# Patient Record
Sex: MALE | Race: BLACK OR AFRICAN AMERICAN | NOT HISPANIC OR LATINO | Employment: FULL TIME | URBAN - METROPOLITAN AREA
[De-identification: names, ages, dates, MRNs, and addresses within clinical notes are randomized per-mention and may not be internally consistent; named-entity substitution may affect disease eponyms.]

---

## 2017-08-14 ENCOUNTER — OFFICE VISIT (OUTPATIENT)
Dept: URGENT CARE | Facility: CLINIC | Age: 22
End: 2017-08-14
Payer: OTHER MISCELLANEOUS

## 2017-08-14 VITALS
OXYGEN SATURATION: 100 % | WEIGHT: 141.2 LBS | TEMPERATURE: 99.2 F | DIASTOLIC BLOOD PRESSURE: 80 MMHG | BODY MASS INDEX: 19.77 KG/M2 | HEIGHT: 71 IN | HEART RATE: 94 BPM | SYSTOLIC BLOOD PRESSURE: 128 MMHG

## 2017-08-14 DIAGNOSIS — R11.2 NON-INTRACTABLE VOMITING WITH NAUSEA, UNSPECIFIED VOMITING TYPE: ICD-10-CM

## 2017-08-14 DIAGNOSIS — R42 DIZZINESS: ICD-10-CM

## 2017-08-14 PROCEDURE — 99214 OFFICE O/P EST MOD 30 MIN: CPT | Performed by: FAMILY MEDICINE

## 2017-08-14 RX ORDER — ONDANSETRON 4 MG/1
TABLET, ORALLY DISINTEGRATING ORAL
Qty: 15 TAB | Refills: 0 | Status: SHIPPED | OUTPATIENT
Start: 2017-08-14 | End: 2019-03-14

## 2017-08-14 RX ORDER — MECLIZINE HYDROCHLORIDE 25 MG/1
TABLET ORAL
Qty: 30 TAB | Refills: 0 | Status: SHIPPED | OUTPATIENT
Start: 2017-08-14 | End: 2019-03-14

## 2017-08-14 NOTE — MR AVS SNAPSHOT
"Mauricio Mcfadden   2017 6:30 PM   Office Visit   MRN: [id number]    Department:  [facility]   Dept Phone:  [phone]    Description:  Male : 1995   Provider:  Pradeep Pham M.D.           Reason for Visit     Dizziness \"nausea started friday morning happened out of no where\"      Allergies as of 2017     No Known Allergies      You were diagnosed with     Dizziness   [[id number]]       Non-intractable vomiting with nausea, unspecified vomiting type   [[id number]]         Vital Signs     Blood Pressure Pulse Temperature Height Weight Body Mass Index    128/80 mmHg 94 37.3 °C (99.2 °F) 1.803 m (5' 11\") 64.048 kg (141 lb 3.2 oz) 19.70 kg/m2    Oxygen Saturation Smoking Status                100% Current Every Day Smoker          Basic Information     Date Of Birth Sex Race Ethnicity Preferred Language    1995 Male Black or  Non- English      Health Maintenance        Date Due Completion Dates    IMM HEP B VACCINE (1 of 3 - Primary Series) 1993 ---    IMM HEP A VACCINE (1 of 2 - Standard Series) 1994 ---    IMM HPV VACCINE (1 of 3 - Male 3 Dose Series) 2004 ---    IMM VARICELLA (CHICKENPOX) VACCINE (1 of 2 - 2 Dose Adolescent Series) 2006 ---    IMM DTaP/Tdap/Td Vaccine (1 - Tdap) 2012 ---    IMM INFLUENZA (1) 2017 ---            Current Immunizations     No immunizations on file.      Below and/or attached are the medications your provider expects you to take. Review all of your home medications and newly ordered medications with your provider and/or pharmacist. Follow medication instructions as directed by your provider and/or pharmacist. Please keep your medication list with you and share with your provider. Update the information when medications are discontinued, doses are changed, or new medications (including over-the-counter products) are added; and carry medication information at all times in the event of emergency situations  "    Allergies:  No Known Allergies          Medications  Valid as of: August 14, 2017 -  7:47 PM    Generic Name Brand Name Tablet Size Instructions for use    Albuterol Sulfate (Aero Soln) albuterol 108 (90 BASE) MCG/ACT Inhale 2 Puffs by mouth every 6 hours as needed for Shortness of Breath.        Meclizine HCl (Tab) ANTIVERT 25 MG 0.5 TO 1 TAB EVERY 6 HOURS ONLY IF NEEDED FOR DIZZINESS, NAUSEA, OR VOMITING. MAY CAUSE DROWSINESS.        Ondansetron (TABLET DISPERSIBLE) ZOFRAN ODT 4 MG 1 TAB, ALLOW TO DISINTEGRATE IN MOUTH, EVERY 8 HOURS ONLY IF NEEDED FOR NAUSEA OR VOMITING.        .                 Medicines prescribed today were sent to:     [facility] [address] [place] [address], [place] - [address] [facility] [facility] [address] [place] [address] [address] [facility] [address]    Phone: [phone] Fax: [phone]    Open 24 Hours?: Yes      Medication refill instructions:       If your prescription bottle indicates you have medication refills left, it is not necessary to call your provider’s office. Please contact your pharmacy and they will refill your medication.    If your prescription bottle indicates you do not have any refills left, you may request refills at any time through one of the following ways: The online CardSpring system (except Urgent Care), by calling your provider’s office, or by asking your pharmacy to contact your provider’s office with a refill request. Medication refills are processed only during regular business hours and may not be available until the next business day. Your provider may request additional information or to have a follow-up visit with you prior to refilling your medication.   *Please Note: Medication refills are assigned a new Rx number when refilled electronically. Your pharmacy may indicate that no refills were authorized even though a new prescription for the same medication is available at the pharmacy. Please request the medicine by name with the pharmacy before contacting your  provider for a refill.           Silvigen Access Code: [email or web address]  Expires: 9/13/2017  7:47 PM    Silvigen  A secure, online tool to manage your health information     [employer]’s Silvigen® is a secure, online tool that connects you to your personalized health information from the privacy of your home -- day or night - making it very easy for you to manage your healthcare. Once the activation process is completed, you can even access your medical information using the Silvigen betito, which is available for free in the Apple Betito store or Google Play store.     Silvigen provides the following levels of access (as shown below):   My Chart Features   [facility] Primary Care Doctor [facility]  Specialists [facility]  Urgent  Care Non-[facility]  Primary Care  Doctor   Email your healthcare team securely and privately 24/7 X X X    Manage appointments: schedule your next appointment; view details of past/upcoming appointments X      Request prescription refills. X      View recent personal medical records, including lab and immunizations X X X X   View health record, including health history, allergies, medications X X X X   Read reports about your outpatient visits, procedures, consult and ER notes X X X X   See your discharge summary, which is a recap of your hospital and/or ER visit that includes your diagnosis, lab results, and care plan. X X       How to register for Silvigen:  1. Go to  [email or web address].  2. Click on the Sign Up Now box, which takes you to the New Member Sign Up page. You will need to provide the following information:  a. Enter your Silvigen Access Code exactly as it appears at the top of this page. (You will not need to use this code after you’ve completed the sign-up process. If you do not sign up before the expiration date, you must request a new code.)   b. Enter your date of birth.   c. Enter your home email address.   d. Click Submit, and follow the next screen’s instructions.  3. Create a  [employer] (acquired by [employer] in 2014)t ID. This will be your [employer] login ID and cannot be changed, so think of one that is secure and easy to remember.  4. Create a [employer] [address] password. You can change your password at any time.  5. Enter your Password Reset Question and Answer. This can be used at a later time if you forget your password.   6. Enter your e-mail address. This allows you to receive e-mail notifications when new information is available in [employer].  7. Click Sign Up. You can now view your health information.    For assistance activating your [employer] account, call [phone]        Quit Tobacco Information     Do you want to quit using tobacco?    Quitting tobacco decreases risks of cancer, heart and lung disease, increases life expectancy, improves sense of taste and smell, and increases spending money, among other benefits.    If you are thinking about quitting, we can help.  • [facility] Quit Tobacco Program: [phone]  o Program occurs weekly for four weeks and includes pharmacist consultation on products to support quitting smoking or chewing tobacco. A provider referral is needed for pharmacist consultation.  • Tobacco Users Help Hotline: 6-061-QUIT-NOW (047-2640) or https://[place].quitlogix.org/  o Free, confidential telephone and online coaching for [place] residents. Sessions are designed on a schedule that is convenient for you. Eligible clients receive free nicotine replacement therapy.  • Nationally: www.smokefree.gov  o Information and professional assistance to support both immediate and long-term needs as you become, and remain, a non-smoker. Smokefree.gov allows you to choose the help that best fits your needs.

## 2017-08-15 NOTE — PROGRESS NOTES
"Chief Complaint:    Chief Complaint   Patient presents with   • Dizziness     \"nausea started friday morning happened out of no where\"       History of Present Illness:    This is a new problem. Started with intermittent, unpredictable nausea on 8/11/17. Vomited once on 8/13/17. No other vomiting episodes. Overall nausea problem is staying same. He is also having intermittent, unpredictable dizziness, described as feeling off-balance/lightheaded. Has had occl subjective fever. Has mild rhinorrhea for few days. No purulent mucus from nose. Had brief episode of tinnitus one day which is no longer present. No decreased hearing. No sore throat, cough, or diarrhea.      Review of Systems:    Constitutional: See HPI.  Eyes: Negative for change in vision, photophobia, pain, redness, and discharge.  ENT: See HPI.  Respiratory: Negative for cough, hemoptysis, sputum production, shortness of breath, wheezing, and stridor.    Cardiovascular: Negative for chest pain, palpitations, orthopnea, claudication, leg swelling, and PND.   Gastrointestinal: See HPI.   Genitourinary: Negative for dysuria, urinary urgency, urinary frequency, hematuria, and flank pain.   Musculoskeletal: Negative for myalgias, joint pain, neck pain, and back pain.   Skin: Negative for rash and itching.   Neurological: See HPI.   Endo: Negative for polydipsia.   Heme: Does not bruise/bleed easily.   Psychiatric/Behavioral: Negative for depression, suicidal ideas, hallucinations, memory loss and substance abuse. The patient is not nervous/anxious and does not have insomnia.      Past Medical History:    Past Medical History   Diagnosis Date   • Asthma        Past Surgical History:    History reviewed. No pertinent past surgical history.    Social History:    Social History     Social History   • Marital Status: Single     Spouse Name: N/A   • Number of Children: N/A   • Years of Education: N/A     Occupational History   • Not on file.     Social History Main " "Topics   • Smoking status: Current Every Day Smoker -- 1.00 packs/day     Types: Cigarettes   • Smokeless tobacco: Never Used   • Alcohol Use: 0.0 oz/week     0 Standard drinks or equivalent per week      Comment: 2-3 drinks    • Drug Use: Yes     Special: Inhaled      Comment: thc   • Sexual Activity: Not on file     Other Topics Concern   • Not on file     Social History Narrative       Family History:    History reviewed. No pertinent family history.    Medications:    Current Outpatient Prescriptions on File Prior to Visit   Medication Sig Dispense Refill   • albuterol (VENTOLIN OR PROVENTIL) 108 (90 BASE) MCG/ACT Aero Soln inhalation aerosol Inhale 2 Puffs by mouth every 6 hours as needed for Shortness of Breath.       No current facility-administered medications on file prior to visit.       Allergies:    No Known Allergies      Vitals:    Filed Vitals:    08/14/17 1903   BP: 128/80   Pulse: 94   Temp: 37.3 °C (99.2 °F)   Height: 1.803 m (5' 11\")   Weight: 64.048 kg (141 lb 3.2 oz)   SpO2: 100%       Physical Exam:    Constitutional: Vital signs reviewed. Appears well-developed and well-nourished. No acute distress.   Eyes: Sclera white, conjunctivae clear. PERRLA.  ENT: External ears normal. External auditory canals normal without discharge. TMs translucent and non-bulging. Hearing normal. Nasal mucosa erythematous bilaterally. Lips/teeth are normal. Oral mucosa pink and moist. Posterior pharynx: WNL.  Neck: Neck supple.   Cardiovascular: Regular rate and rhythm. No murmur.  Pulmonary/Chest: Respirations non-labored. Clear to auscultation bilaterally.  Abdomen: Bowel sounds are normal active. Soft, non-distended, and non-tender to palpation.  Lymph: Cervical nodes without tenderness or enlargement.  Musculoskeletal: Normal gait. Normal range of motion. No tenderness to palpation. No muscular atrophy or weakness.  Neurological: Alert and oriented to person, place, and time. CN 2-12 intact. Muscle tone normal. " Coordination normal. Normal cerebellar exam. Negative Valentine-Hallpike maneuver bilaterally.  Skin: No rashes or lesions. Warm, dry, normal turgor.  Psychiatric: Normal mood and affect. Behavior is normal. Judgment and thought content normal.       Assessment / Plan:    1. Dizziness  - meclizine (ANTIVERT) 25 MG Tab; 0.5 TO 1 TAB EVERY 6 HOURS ONLY IF NEEDED FOR DIZZINESS, NAUSEA, OR VOMITING. MAY CAUSE DROWSINESS.  Dispense: 30 Tab; Refill: 0    2. Non-intractable vomiting with nausea, unspecified vomiting type  - ondansetron (ZOFRAN ODT) 4 MG TABLET DISPERSIBLE; 1 TAB, ALLOW TO DISINTEGRATE IN MOUTH, EVERY 8 HOURS ONLY IF NEEDED FOR NAUSEA OR VOMITING.  Dispense: 15 Tab; Refill: 0      Discussed with him DDX and management options.    ? etiology. Overall vitals are stable and exam is benign.     Rec'd to treat symptoms and see if problem will self-resolve in few days. He is agreeable.    Agreeable to medications prescribed.    Follow-up with PCP or urgent care if getting worse, change in symptoms, meds don't help, or symptoms persist beyond 1 week.

## 2019-03-14 ENCOUNTER — OFFICE VISIT (OUTPATIENT)
Dept: URGENT CARE | Facility: CLINIC | Age: 24
End: 2019-03-14
Payer: COMMERCIAL

## 2019-03-14 VITALS
TEMPERATURE: 98.6 F | SYSTOLIC BLOOD PRESSURE: 128 MMHG | HEIGHT: 71 IN | OXYGEN SATURATION: 99 % | DIASTOLIC BLOOD PRESSURE: 80 MMHG | HEART RATE: 76 BPM | RESPIRATION RATE: 16 BRPM | BODY MASS INDEX: 20.86 KG/M2 | WEIGHT: 149 LBS

## 2019-03-14 DIAGNOSIS — R42 DIZZINESS: ICD-10-CM

## 2019-03-14 DIAGNOSIS — R53.1 UNILATERAL WEAKNESS: ICD-10-CM

## 2019-03-14 DIAGNOSIS — F41.9 ANXIETY: ICD-10-CM

## 2019-03-14 PROCEDURE — 99214 OFFICE O/P EST MOD 30 MIN: CPT | Performed by: PHYSICIAN ASSISTANT

## 2019-03-16 NOTE — PROGRESS NOTES
"Chief Complaint   Patient presents with   • Dizziness     x 1 wk, dizziness, weakness on Rt. leg, hard to write with Rt. hand and feels pressure on head       HISTORY OF PRESENT ILLNESS: Patient is a 23 y.o. male who presents today for about 1 week of waxing and waning feeling of dizziness/off balance, intermittent sensation of weakness of right knee and right hand/arm.  Patient states it has been coming and going and there does not seem to a specific trigger.  He denies any headache currently but notes some \"pressure\" at times on and off.   He has felt moments where he has had some perception of difficulty using his right hand to grasp/write however this comes and goes.  He denies falling or legs buckling.  No numbness, tingling.  No vision changes.  No speech changes.  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   He admits that he been drinking some more alcohol lately than he has in the past.  He has family history of anxiety, most notably in his mother.  No current depression symptoms.   He has not taken anything OTC.  He has never been treated for anxiety or depression.   No fevers, URI symptoms.     There are no active problems to display for this patient.      Allergies:Patient has no known allergies.    Current Outpatient Prescriptions Ordered in [facility]   Medication Sig Dispense Refill   • albuterol (VENTOLIN OR PROVENTIL) 108 (90 BASE) MCG/ACT Aero Soln inhalation aerosol Inhale 2 Puffs by mouth every 6 hours as needed for Shortness of Breath.       No current [facility]-ordered facility-administered medications on file.        Past Medical History:   Diagnosis Date   • Asthma        Social History   Substance Use Topics   • Smoking status: Current Every Day Smoker     Packs/day: 1.00     Types: Cigarettes   • Smokeless tobacco: Never Used   • Alcohol use 0.0 oz/week      Comment: 2-3 drinks        No family status information on file.   No family history on " "file. FH anxiety.     ROS:  Review of Systems   Constitutional: Negative for fever, chills, weight loss and malaise/fatigue.   HENT: Negative for ear pain, nosebleeds, congestion, sore throat and neck pain.    Eyes: Negative for blurred vision.   Respiratory: Negative for cough, sputum production, shortness of breath and wheezing.    Cardiovascular: Negative for chest pain, palpitations, orthopnea and leg swelling.   Gastrointestinal: Negative for heartburn, nausea, vomiting and abdominal pain.   Genitourinary: Negative for dysuria, urgency and frequency.   Neuro: SEE HPI  Psych: Normal mood/affect    Exam:  Blood pressure 128/80, pulse 76, temperature 37 °C (98.6 °F), temperature source Temporal, resp. rate 16, height 1.803 m (5' 10.98\"), weight 67.6 kg (149 lb), SpO2 99 %.  General:  Well nourished, well developed male in NAD  Eyes: PERRLA, EOM within normal limits, no conjunctival injection, no scleral icterus, visual fields and acuity grossly intact.  Ears: Normal shape and symmetry, no tenderness, no discharge. External canals are without any significant edema or erythema. Tympanic membranes are without any inflammation, no effusion. Gross auditory acuity is intact  Nose: Symmetrical, sinuses without tenderness, no discharge.   Mouth: reasonable hygiene, no erythema exudates or tonsillar enlargement.  Neck: no masses, range of motion within normal limits, no tracheal deviation. No lymphadenopathy  Pulmonary: Normal respiratory effort, no wheezes, crackles, or rhonchi.  Cardiovascular: regular rate and rhythm without murmurs, rubs, or gallops.  Abdomen: Soft, nontender, nondistended. Normal bowel sounds. No hepatosplenomegaly or masses, or hernias. No rebound or guarding.  Skin: No visible rashes or lesion. Warm, pink, dry.   Extremities: no clubbing, cyanosis, or edema.  Neuro: A&O x 3.  CN 2-12 grossly intact.  Motor strength upper and lower full and intact bilaterally.  Speech normal/clear.  Normal " coordination (normal rapid alt movements and finger to nose testing). No pronator drift. Normal gait.  2+ DTRs bilaterally.   Psych: Slightly anxious mood/normal affect        Assessment/Plan:  1. Dizziness     2. Unilateral weakness      W/NORMAL PE   3. Anxiety            -patient has benign physical exam with no objective right sided weakness or abnormalities detected.  Discussed presentation is concerning however and did present recommendation of higher level of care/ED for further work up at this time.  As symptoms have been going on and off for week patient states he would rather make follow up with PCP to discuss anxiety at this time and not go to ED at this time.  He states he will proceed to ED immediately for any new or concerning changes.  I will place a referral to follow up in primary care as well.    -25 mins were spent with patient, >50% of which were counseling of coordination and care.       Nathalie Perera P.A.-C.

## 2019-03-20 ENCOUNTER — OFFICE VISIT (OUTPATIENT)
Dept: MEDICAL GROUP | Facility: PHYSICIAN GROUP | Age: 24
End: 2019-03-20
Payer: COMMERCIAL

## 2019-03-20 VITALS
SYSTOLIC BLOOD PRESSURE: 118 MMHG | RESPIRATION RATE: 16 BRPM | WEIGHT: 148 LBS | BODY MASS INDEX: 20.72 KG/M2 | TEMPERATURE: 99.3 F | HEIGHT: 71 IN | DIASTOLIC BLOOD PRESSURE: 72 MMHG | HEART RATE: 99 BPM | OXYGEN SATURATION: 93 %

## 2019-03-20 DIAGNOSIS — F41.9 ANXIETY: ICD-10-CM

## 2019-03-20 DIAGNOSIS — J45.20 MILD INTERMITTENT ASTHMA WITHOUT COMPLICATION: ICD-10-CM

## 2019-03-20 DIAGNOSIS — F32.1 CURRENT MODERATE EPISODE OF MAJOR DEPRESSIVE DISORDER WITHOUT PRIOR EPISODE (HCC): ICD-10-CM

## 2019-03-20 DIAGNOSIS — R53.1 UNILATERAL WEAKNESS: ICD-10-CM

## 2019-03-20 PROBLEM — J45.909 ASTHMA: Status: ACTIVE | Noted: 2019-03-20

## 2019-03-20 PROCEDURE — 99214 OFFICE O/P EST MOD 30 MIN: CPT | Performed by: PHYSICIAN ASSISTANT

## 2019-03-20 RX ORDER — CITALOPRAM 20 MG/1
20 TABLET ORAL DAILY
Qty: 30 TAB | Refills: 2 | Status: SHIPPED | OUTPATIENT
Start: 2019-03-20 | End: 2020-04-08

## 2019-03-20 ASSESSMENT — PATIENT HEALTH QUESTIONNAIRE - PHQ9
5. POOR APPETITE OR OVEREATING: 3 - NEARLY EVERY DAY
CLINICAL INTERPRETATION OF PHQ2 SCORE: 1
SUM OF ALL RESPONSES TO PHQ QUESTIONS 1-9: 14

## 2019-03-20 NOTE — PROGRESS NOTES
Chief Complaint   Patient presents with   • Establish Care     possible anxiety       HISTORY OF PRESENT ILLNESS: Mauricio Mcfadden is an established 23 y.o. male here to discuss the evaluation and management of:      Patient is a pleasant 23-year-old male here today to establish care and discuss anxiety and depression.  He tells me for the past 1+ week he has been experiencing intermittent episodes of right hand cramping and right lower extremity weakness.  He admits that symptoms occur when he is feeling overwhelmed and anxious.  States symptoms exacerbate during that time because he is fixated on experiencing symptoms.  He denies vision changes, nausea, vomiting, facial/arm drooping, headache at the time symptoms occur, but he does mention that he experiences mild frontal head pressure.  States he notices if he is holding a pencil at the time his symptoms occur it is hard for him to grasp it the way he would like.  Patient denies numbness/tingling, decreased  strength, slurred speech or speech changes.  He admits that he currently enrolled and grad school full-time and is working as a  full-time.  He tells me that he found out that his girlfriend was cheating on him in 01/19 and they are trying to make their relationship work.  He admits that he had also cheated on his girlfriend in the past and was honest with her.  States since his girlfriend was honest about the infidelity he feels that he lost balance in his life.  States prior to the infidelity he was working out felt well overall.  He tells me that he does not eat regular meals and knows that he does not eat enough through the day.  He also mentions that he does not sleep well due to anxiety.  Admits on average she sleeps 6 hours per night.  States he could be taking better care of himself.  He tells me that he feels that THC use exacerbate symptoms.  States he has a positive family history for anxiety (mother and brother).  Patient denies  alcohol abuse.  He denies fever, chills, nausea, vomiting, night sweats, unintentional weight loss.   Denies being treated for anxiety or depression in the past.  Patient is inquiring about treatment options for anxiety and depression.    Depression Screening    Little interest or pleasure in doing things?  0 - not at all   Feeling down, depressed , or hopeless? 1 - several days   Trouble falling or staying asleep, or sleeping too much?  3 - nearly every day   Feeling tired or having little energy?  3 - nearly every day   Poor appetite or overeating?  3 - nearly every day   Feeling bad about yourself - or that you are a failure or have let yourself or your family down? 3 - nearly every day   Trouble concentrating on things, such as reading the newspaper or watching television? 0 - not at all   Moving or speaking so slowly that other people could have noticed.  Or the opposite - being so fidgety or restless that you have been moving around a lot more than usual?  1 - several days   Thoughts that you would be better off dead, or of hurting yourself?  0 - not at all   Patient Health Questionnaire Score: 14       If depressive symptoms identified deferred to follow up visit unless specifically addressed in assesment and plan.    Interpretation of PHQ-9 Total Score   Score Severity   1-4 No Depression   5-9 Mild Depression   10-14 Moderate Depression   15-19 Moderately Severe Depression   20-27 Severe Depression      He tells me he has a positive medical history for mild intermittent asthma that is uncomplicated.  States he uses an albuterol inhaler as needed.  On average he uses inhaler twice per year.  States he was diagnosed with asthma at 2-3 years old and has not had PFTs in several years.  Denies ever being hospitalized for acute asthmatic attacks.      Patient Active Problem List    Diagnosis Date Noted   • Asthma 03/20/2019       Allergies:Patient has no known allergies.    Current Outpatient Prescriptions    Medication Sig Dispense Refill   • citalopram (CELEXA) 20 MG Tab Take 1 Tab by mouth every day. 30 Tab 2   • albuterol (VENTOLIN OR PROVENTIL) 108 (90 BASE) MCG/ACT Aero Soln inhalation aerosol Inhale 2 Puffs by mouth every 6 hours as needed for Shortness of Breath.       No current facility-administered medications for this visit.        Social History   Substance Use Topics   • Smoking status: Never Smoker   • Smokeless tobacco: Never Used   • Alcohol use 0.0 oz/week      Comment: 7 + drinks per week.        Family Status   Relation Status   • Mo Alive   • Fa Alive   • Bro Alive   • MGMo    • MGFa    • PGMo    • PGFa      Family History   Problem Relation Age of Onset   • No Known Problems Mother    • Prostate cancer Father    • Cancer Father         Kidney CA   • Hypertension Father    • Hypertension Brother    • Heart Disease Maternal Grandmother        ROS:  Review of Systems   Constitutional: Negative for fever, chills, weight loss and malaise/fatigue.   HENT: Negative for ear pain, nosebleeds, congestion, sore throat and neck pain.    Eyes: Negative for blurred vision.   Respiratory: Negative for cough, sputum production, shortness of breath and wheezing.    Cardiovascular: Negative for chest pain, palpitations, orthopnea and leg swelling.   Gastrointestinal: Negative for heartburn, nausea, vomiting and abdominal pain.   Genitourinary: Negative for dysuria, urgency and frequency.   Musculoskeletal: Negative for myalgias, back pain and joint pain.   Skin: Negative for rash and itching.   Neurological: Negative for dizziness, tingling, tremors, sensory change, focal weakness and headaches.   Endo/Heme/Allergies: Does not bruise/bleed easily.   Psychiatric/Behavioral: Negative for suicidal ideas and memory loss.  Positive for depression and anxiety.  Positive for sleep deprivation.  All other systems reviewed and are negative except as in HPI.    Exam: Blood pressure 118/72,  "pulse 99, temperature 37.4 °C (99.3 °F), resp. rate 16, height 1.803 m (5' 10.98\"), weight 67.1 kg (148 lb), SpO2 93 %. Body mass index is 20.65 kg/m².  General: Normal appearing. No distress.  HEENT: Normocephalic. Eyes conjunctiva clear lids without ptosis, pupils equal and reactive to light accommodation, ears normal shape and contour, canals are clear bilaterally, tympanic membranes are benign, nasal mucosa benign, oropharynx is without erythema, edema or exudates.   Neck: Supple without JVD or bruit. Thyroid is not enlarged.  Pulmonary: Clear to ausculation.  Normal effort. No rales, ronchi, or wheezing.  Cardiovascular: Regular rate and rhythm without murmur.   Abdomen: Soft, nontender, nondistended. Normal bowel sounds. Liver and spleen are not palpable  Neurologic: Grossly nonfocal.  Cranial nerves are normal.  Motor strength of upper and lower extremities full and intact bilaterally.  No abnormalities of speech.  Normal coordination.   strength equal bilaterally.   Lymph: No cervical, supraclavicular or axillary lymph nodes are palpable  Skin: Warm and dry.  No rashes or suspicious skin lesions.  Musculoskeletal: Normal gait. No extremity cyanosis, clubbing, or edema.  Negative Tinel's test.  Negative Phalen's test.   strength is equal bilaterally.  Negative for muscle atrophy or weakness of upper extremities.  Psych: Patient appears to be anxious.  Normal affect.  Alert and oriented x3. Judgment and insight is normal.      Medical decision-making and discussion:  1. Anxiety  2. Current moderate episode of major depressive disorder without prior episode (HCC)  Patient has been prescribed Celexa 20 mg tab advised take 1 tab by mouth every day.  Discussed common side effects and adverse reactions of medication with patient.  Advised patient he may experience side effects for 2+ weeks but side effect symptoms should subside after weeks 4-6.  Patient will follow-up in 3 weeks for close evaluation.  Will " discuss medication adjustments at that time.  Emphasized benefits of healthy diet, regular exercise routine, practicing good sleep hygiene.  Advised patient to develop coping mechanisms for stress and anxiety.  Patient declined therapy at this time but is not opposed to therapy in the near future.  Emphasized the importance of self-care.  Advised patient to work on THC cessation and avoid triggers that exacerbate anxiety and depression symptoms.    Denies any suicidal or homicidal ideation. Discussed that should the patient have any symptoms they should call suicide prevention hotline or report to the emergency room immediately.    - citalopram (CELEXA) 20 MG Tab; Take 1 Tab by mouth every day.  Dispense: 30 Tab; Refill: 2    3. Mild intermittent asthma without complication  Chronic with stable problem.  Continue using inhaler as indicated.  Advised patient if he ever develops shortness of breath that is not alleviated with medication to seek immediate emergency care.    4. Unilateral weakness with normal physical exam.  Etiology unknown at this time.  I suspect symptoms are associated with acute exacerbations of anxiety.    Discussed ED precautions with patient in great detail.  Patient will follow-up in 3 weeks for reevaluation.      Please note that this dictation was created using voice recognition software. I have made every reasonable attempt to correct obvious errors, but I expect that there are errors of grammar and possibly content that I did not discover before finalizing the note.        Return in about 3 weeks (around 4/10/2019), or if symptoms worsen or fail to improve.

## 2019-04-12 ENCOUNTER — OFFICE VISIT (OUTPATIENT)
Dept: MEDICAL GROUP | Facility: PHYSICIAN GROUP | Age: 24
End: 2019-04-12
Payer: COMMERCIAL

## 2019-04-12 VITALS
BODY MASS INDEX: 20.72 KG/M2 | DIASTOLIC BLOOD PRESSURE: 80 MMHG | OXYGEN SATURATION: 99 % | TEMPERATURE: 98.9 F | WEIGHT: 148 LBS | SYSTOLIC BLOOD PRESSURE: 112 MMHG | HEIGHT: 71 IN | HEART RATE: 88 BPM | RESPIRATION RATE: 16 BRPM

## 2019-04-12 DIAGNOSIS — F41.9 ANXIETY: ICD-10-CM

## 2019-04-12 DIAGNOSIS — F32.1 CURRENT MODERATE EPISODE OF MAJOR DEPRESSIVE DISORDER WITHOUT PRIOR EPISODE (HCC): ICD-10-CM

## 2019-04-12 PROCEDURE — 99213 OFFICE O/P EST LOW 20 MIN: CPT | Performed by: PHYSICIAN ASSISTANT

## 2019-04-15 PROBLEM — F32.1 CURRENT MODERATE EPISODE OF MAJOR DEPRESSIVE DISORDER WITHOUT PRIOR EPISODE (HCC): Status: ACTIVE | Noted: 2019-04-15

## 2019-04-15 PROBLEM — F41.9 ANXIETY: Status: ACTIVE | Noted: 2019-04-15

## 2019-04-16 NOTE — PROGRESS NOTES
Chief Complaint   Patient presents with   • Follow-Up     anxiety       HISTORY OF PRESENT ILLNESS: Mauricio Mcfadden is an established 23 y.o. male here to discuss the evaluation and management of:      Patient is a pleasant 23-year-old male here today to follow-up on anxiety and depression.  During her last appointment on 3/20/2019 patient was prescribed Celexa 20 mg tab once daily.  He tells me that he started taking medication 2.5 weeks ago and since starting medication he has noticed that he is less anxious and feels more motivated.  States right hand tremor has also improved.  He mentions that he experienced tremor this a.m. when his brother called him with some sad news.  He tells me that a childhood friend that was 35 years old passed away today from asthma complications.  States that he got the news his right arm started shaking and he felt anxious and sad.  During her last appointment patient was complaining of intermittent episodes of right hand cramping and right lower extremity weakness but also expressed symptoms occur when he is feeling overwhelmed and anxious.  Patient denies numbness/tingling, decreased  strength, slurred speech or speech changes.      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  States he and his partner are working on the relationship.  States overall he is feeling much better.  He tells me that he is no longer using THC.  Denies alcohol abuse.  Denies homicidal or suicidal ideation.      Patient Active Problem List    Diagnosis Date Noted   • Anxiety 04/15/2019   • Current moderate episode of major depressive disorder without prior episode ([facility]) 04/15/2019   • Asthma 03/20/2019       Allergies:Patient has no known allergies.    Current Outpatient Prescriptions   Medication Sig Dispense Refill   • citalopram (CELEXA) 20 MG Tab Take 1 Tab by mouth every day. 30 Tab 2   • albuterol (VENTOLIN OR PROVENTIL) 108 (90 BASE) MCG/ACT  "Aero Soln inhalation aerosol Inhale 2 Puffs by mouth every 6 hours as needed for Shortness of Breath.       No current facility-administered medications for this visit.        Social History   Substance Use Topics   • Smoking status: Never Smoker   • Smokeless tobacco: Never Used   • Alcohol use 0.0 oz/week      Comment: 7 + drinks per week.        Family Status   Relation Status   • Mo Alive   • Fa Alive   • Bro Alive   • MGMo    • MGFa    • PGMo    • PGFa      Family History   Problem Relation Age of Onset   • No Known Problems Mother    • Prostate cancer Father    • Cancer Father         Kidney CA   • Hypertension Father    • Hypertension Brother    • Heart Disease Maternal Grandmother        ROS:  Review of Systems   Constitutional: Negative for fever, chills, weight loss and malaise/fatigue.   HENT: Negative for ear pain, nosebleeds, congestion, sore throat and neck pain.    Eyes: Negative for blurred vision.   Respiratory: Negative for cough, sputum production, shortness of breath and wheezing.    Cardiovascular: Negative for chest pain, palpitations, orthopnea and leg swelling.   Gastrointestinal: Negative for heartburn, nausea, vomiting and abdominal pain.   Genitourinary: Negative for dysuria, urgency and frequency.   Musculoskeletal: Negative for myalgias, back pain and joint pain.   Skin: Negative for rash and itching.   Neurological: Negative for dizziness, tingling, tremors, sensory change, focal weakness and headaches.   Endo/Heme/Allergies: Does not bruise/bleed easily.   Psychiatric/Behavioral: Negative for suicidal ideas and memory loss.  The patient does not have insomnia.  + for anxiety and depression.  All other systems reviewed and are negative except as in HPI.    Exam: /80   Pulse 88   Temp 37.2 °C (98.9 °F)   Resp 16   Ht 1.803 m (5' 10.98\")   Wt 67.1 kg (148 lb)   SpO2 99%  Body mass index is 20.65 kg/m².  General: Normal appearing. No " distress.  HEENT: Normocephalic. Eyes conjunctiva clear lids without ptosis, ears normal shape and contour.   Neck: Supple without JVD or bruit. Thyroid is not enlarged.  Pulmonary: Clear to ausculation.  Normal effort. No rales, ronchi, or wheezing.  Cardiovascular: Regular rate and rhythm without murmur.   Abdomen: Soft, nontender, nondistended.   Neurologic: Grossly nonfocal.  Cranial nerves are normal.   Lymph: No cervical, supraclavicular or axillary lymph nodes are palpable  Skin: Warm and dry.  No rashes or suspicious skin lesions.  Musculoskeletal: Normal gait. No extremity cyanosis, clubbing, or edema.  Psych: Normal mood and affect. Alert and oriented x3. Judgment and insight is normal.    Medical decision-making and discussion:  1. Anxiety  2. Current moderate episode of major depressive disorder without prior episode (HCC)  Patient is feeling well on current medications. Will continue. Emphasized importance of healthy diet and exercise.  Continue working on sleep hygiene.  Any working on coping mechanisms for stress and anxiety.  Avoid known triggers.  Denies any suicidal or homicidal ideation.  Discussed that should the patient have any symptoms they should call suicide prevention hotline or report to the emergency room immediately.    Patient will follow-up in 6 weeks for reevaluation.  Will make medication adjustments at that time if needed.      Please note that this dictation was created using voice recognition software. I have made every reasonable attempt to correct obvious errors, but I expect that there are errors of grammar and possibly content that I did not discover before finalizing the note.        Return in about 6 weeks (around 5/24/2019).

## 2019-05-22 ENCOUNTER — APPOINTMENT (OUTPATIENT)
Dept: MEDICAL GROUP | Facility: PHYSICIAN GROUP | Age: 24
End: 2019-05-22
Payer: COMMERCIAL

## 2019-05-31 ENCOUNTER — OFFICE VISIT (OUTPATIENT)
Dept: MEDICAL GROUP | Facility: PHYSICIAN GROUP | Age: 24
End: 2019-05-31
Payer: COMMERCIAL

## 2019-05-31 VITALS
WEIGHT: 153.8 LBS | HEART RATE: 87 BPM | RESPIRATION RATE: 18 BRPM | HEIGHT: 71 IN | BODY MASS INDEX: 21.53 KG/M2 | OXYGEN SATURATION: 98 % | DIASTOLIC BLOOD PRESSURE: 70 MMHG | TEMPERATURE: 98.6 F | SYSTOLIC BLOOD PRESSURE: 120 MMHG

## 2019-05-31 DIAGNOSIS — F32.5 MAJOR DEPRESSIVE DISORDER WITH SINGLE EPISODE, IN FULL REMISSION (HCC): ICD-10-CM

## 2019-05-31 DIAGNOSIS — J45.20 MILD INTERMITTENT ASTHMA WITHOUT COMPLICATION: ICD-10-CM

## 2019-05-31 PROCEDURE — 99213 OFFICE O/P EST LOW 20 MIN: CPT | Performed by: PHYSICIAN ASSISTANT

## 2019-05-31 ASSESSMENT — PATIENT HEALTH QUESTIONNAIRE - PHQ9: CLINICAL INTERPRETATION OF PHQ2 SCORE: 0

## 2019-05-31 NOTE — PROGRESS NOTES
Chief Complaint   Patient presents with   • Follow-Up     Check up on anxiety        HISTORY OF PRESENT ILLNESS: Mauricio Mcfadden is an established 23 y.o. male here to discuss the evaluation and management of:    Patient is a pleasant 23-year-old male here today follow-up on anxiety and depression.  Patient was prescribed Celexa 20 mg tab once daily on 3/20/2019.  He tells me that he has been taking a half a tablet by mouth once daily and since doing so anxiety and depression symptoms have drastically improved.  States he is no longer experiencing a tremor or right lower extremity weakness.  Tremor and a little right lower extremity weakness develops when he is feeling overwhelmed and anxious.  States since her last appointment he is been working on diet, exercise, sleep hygiene.  He tells me that he and his partner have open communication on and are doing well.  States he feels a lot of the anxiety and depression was stemming from being overwhelmed with school and trying to find a work life balance.  He tells me since her last appointment he has gained weight and is happy about this.  He denies homicidal or suicidal ideation.  He does mention that he started using THC again and smokes marijuana daily.  He denies alcohol abuse.    Depression Screening    Little interest or pleasure in doing things?  0 - not at all  Feeling down, depressed , or hopeless? 0 - not at all  Patient Health Questionnaire Score: 0    If depressive symptoms identified deferred to follow up visit unless specifically addressed in assesment and plan.      Interpretation of PHQ-9 Total Score   Score Severity   1-4 Minimal Depression   5-9 Mild Depression   10-14 Moderate Depression   15-19 Moderately Severe Depression   20-27 Severe Depression          Patient Active Problem List    Diagnosis Date Noted   • Anxiety 04/15/2019   • Major depressive disorder with single episode, in full remission (HCC) 04/15/2019   • Asthma 03/20/2019        Allergies:Patient has no known allergies.    Current Outpatient Prescriptions   Medication Sig Dispense Refill   • citalopram (CELEXA) 20 MG Tab Take 1 Tab by mouth every day. 30 Tab 2   • albuterol (VENTOLIN OR PROVENTIL) 108 (90 BASE) MCG/ACT Aero Soln inhalation aerosol Inhale 2 Puffs by mouth every 6 hours as needed for Shortness of Breath.       No current facility-administered medications for this visit.        Social History   Substance Use Topics   • Smoking status: Never Smoker   • Smokeless tobacco: Never Used   • Alcohol use 0.0 oz/week      Comment: 4 drinks per week.        Family Status   Relation Status   • Mo Alive   • Fa Alive   • Bro Alive   • MGMo    • MGFa    • PGMo    • PGFa      Family History   Problem Relation Age of Onset   • No Known Problems Mother    • Prostate cancer Father    • Cancer Father         Kidney CA   • Hypertension Father    • Hypertension Brother    • Heart Disease Maternal Grandmother        ROS:  Review of Systems   Constitutional: Negative for fever, chills, weight loss and malaise/fatigue.   HENT: Negative for ear pain, nosebleeds, congestion, sore throat and neck pain.    Eyes: Negative for blurred vision.   Respiratory: Negative for cough, sputum production, shortness of breath and wheezing.    Cardiovascular: Negative for chest pain, palpitations, orthopnea and leg swelling.   Gastrointestinal: Negative for heartburn, nausea, vomiting and abdominal pain.   Genitourinary: Negative for dysuria, urgency and frequency.   Musculoskeletal: Negative for myalgias, back pain and joint pain.   Skin: Negative for rash and itching.   Neurological: Negative for dizziness, tingling, tremors, sensory change, focal weakness and headaches.   Endo/Heme/Allergies: Does not bruise/bleed easily.   Psychiatric/Behavioral: Negative for suicidal ideas and memory loss.  The patient does not have insomnia.  Positive for anxiety and depression treated with  "medication.  All other systems reviewed and are negative except as in HPI.    Exam: /70 (BP Location: Left arm, Patient Position: Sitting, BP Cuff Size: Adult)   Pulse 87   Temp 37 °C (98.6 °F) (Temporal)   Resp 18   Ht 1.803 m (5' 10.98\")   Wt 69.8 kg (153 lb 12.8 oz)   SpO2 98%  Body mass index is 21.46 kg/m².  General: Normal appearing. No distress.  HEENT: Normocephalic. Eyes conjunctiva clear lids without ptosis, ears normal shape and contour.  Neck: Supple without JVD or bruit. Thyroid is not enlarged.  Pulmonary: Clear to ausculation.  Normal effort. No rales, ronchi, or wheezing.  Cardiovascular: Regular rate and rhythm without murmur.   Abdomen: Soft, nontender, nondistended.   Neurologic: Grossly nonfocal.  Cranial nerves are normal.   Lymph: No cervical, supraclavicular or axillary lymph nodes are palpable  Skin: Warm and dry.  No rashes or suspicious skin lesions.  Musculoskeletal: Normal gait. No extremity cyanosis, clubbing, or edema.  Psych: Normal mood and affect. Alert and oriented x3. Judgment and insight is normal.    Medical decision-making and discussion:  1. Major depressive disorder with single episode, in full remission (HCC)  2. Mild intermittent asthma without complication  Continue taking 1/2 tablet of Celexa 20 mg tab once daily.  Discussed with patient he can increase dosage to a full tablet if needed.  Discussed the importance of being compliant with medication. Patient is feeling well on current medications. Emphasized importance of healthy diet and exercise.  Continue working on sleep hygiene and developing coping mechanisms for stress and anxiety.  Avoid known triggers.  Denies any suicidal or homicidal ideation.  Discussed that should the patient have any symptoms they should call suicide prevention hotline or report to the emergency room immediately.     Patient will follow-up in 3 months for reevaluation.  Will make medication adjustments at that time if " needed.      Please note that this dictation was created using voice recognition software. I have made every reasonable attempt to correct obvious errors, but I expect that there are errors of grammar and possibly content that I did not discover before finalizing the note.    Assessment/Plan:  1. Major depressive disorder with single episode, in full remission (HCC)     2. Mild intermittent asthma without complication         Return in about 3 months (around 8/31/2019).

## 2019-08-02 ENCOUNTER — TELEPHONE (OUTPATIENT)
Dept: MEDICAL GROUP | Facility: PHYSICIAN GROUP | Age: 24
End: 2019-08-02

## 2019-08-02 NOTE — TELEPHONE ENCOUNTER
VOICEMAIL  1. Caller Name: Mauricio Mcfadden      Call Back Number: [phone] (home)     2. Message: patient lvm to get letter for school however patient states he would like a call back to discuss what is needed on the letter.     3. Patient approves office to leave a detailed voicemail/MyChart message: N\A

## 2019-08-02 NOTE — TELEPHONE ENCOUNTER
Phone Number Called: [phone] (home)     Call outcome: unable to leave message    Message: Patient left voicemail to get letter for school. Called patient to see what the letter needs to state per voicemail however patient did not answer and voicemail is full.

## 2019-08-02 NOTE — TELEPHONE ENCOUNTER
1. Caller Name: Mauricio Mcfadden                                         Call Back Number: [phone] (home)       Patient approves a detailed voicemail message: N\A    Spoke to patient in regards to what he needs for a letter for school. Patient states he needs a letter with diagnosis including depression in order to get financial aid from school for upcoming semester.

## 2019-08-05 NOTE — TELEPHONE ENCOUNTER
Phone Number Called: [phone] (home)     Call outcome: spoke to patient regarding message below    Message: Spoke to patient and made appointment to discuss school letter per Jessica request.

## 2019-08-07 ENCOUNTER — OFFICE VISIT (OUTPATIENT)
Dept: MEDICAL GROUP | Facility: PHYSICIAN GROUP | Age: 24
End: 2019-08-07
Payer: COMMERCIAL

## 2019-08-07 VITALS
RESPIRATION RATE: 18 BRPM | HEIGHT: 71 IN | TEMPERATURE: 99.1 F | OXYGEN SATURATION: 100 % | SYSTOLIC BLOOD PRESSURE: 120 MMHG | BODY MASS INDEX: 21.11 KG/M2 | WEIGHT: 150.8 LBS | HEART RATE: 82 BPM | DIASTOLIC BLOOD PRESSURE: 70 MMHG

## 2019-08-07 DIAGNOSIS — F32.5 MAJOR DEPRESSIVE DISORDER WITH SINGLE EPISODE, IN FULL REMISSION (HCC): ICD-10-CM

## 2019-08-07 DIAGNOSIS — F41.9 ANXIETY: ICD-10-CM

## 2019-08-07 PROCEDURE — 99214 OFFICE O/P EST MOD 30 MIN: CPT | Performed by: PHYSICIAN ASSISTANT

## 2019-08-07 NOTE — LETTER
August 7, 2019    To Whom It May Concern:         This is confirmation that Mauricio Mcfadden attended his scheduled appointment with Ca Avalos P.A.-C. on 8/07/19. Mauricio Mcfadden is my patient, and has been under my care since 03/20/2019. I am intimately familiar with his medical history. Mauricio has been experiencing increased life stressors and family/friend illnesses. Due to current medical conditions patient has been able to perform to his full potential. He is currently being treated for depression and anxiety and will be following up with me closely.     I am licensed by the [facility] to practice medicine (My license number is [id number]).            If you have any questions please do not hesitate to call me at the phone number listed below.    Sincerely,          Ca Avalos P.A.-C.  [phone]

## 2019-08-07 NOTE — PROGRESS NOTES
Chief Complaint   Patient presents with   • Letter for School/Work     What has been going on this year with his health needed for school        HISTORY OF PRESENT ILLNESS: Mauricio Mcfadden is an established 24 y.o. male here to discuss the evaluation and management of:    Major depressive disorder with single episode, in full remission (HCC)  Anxiety  Patient is a pleasant 24-year-old male here today requesting a letter for his school indicating that he has been diagnosed with depression and anxiety.  He tells me he is in the process of a withdrawal repeal for last semester.  States due to depression and anxiety symptoms his grades dropped last semester. He has been my patient since 3/20/2019 and has been experiencing increased life stressors and family/friend illnesses.  His dad was diagnosed with prostate and bladder cancer and a close friend passed away from an acute asthmatic attack.  He mentions during today's appointment that he and his girlfriend have  since our last appointment.  Patient is currently taking Celexa 10 mg once daily.  He would like to discontinue medication.  States he feels he is developed coping mechanisms to manage anxiety and depression without medication.  He denies homicidal or suicidal ideation.  Patient is no longer experiencing tremors.      Patient Active Problem List    Diagnosis Date Noted   • Anxiety 04/15/2019   • Major depressive disorder with single episode, in full remission (HCC) 04/15/2019   • Asthma 03/20/2019       Allergies:Patient has no known allergies.    Current Outpatient Medications   Medication Sig Dispense Refill   • citalopram (CELEXA) 20 MG Tab Take 1 Tab by mouth every day. 30 Tab 2   • albuterol (VENTOLIN OR PROVENTIL) 108 (90 BASE) MCG/ACT Aero Soln inhalation aerosol Inhale 2 Puffs by mouth every 6 hours as needed for Shortness of Breath.       No current facility-administered medications for this visit.        Social History     Tobacco Use   •  "Smoking status: Never Smoker   • Smokeless tobacco: Never Used   Substance Use Topics   • Alcohol use: Yes     Alcohol/week: 0.0 oz     Comment: 4 drinks per week.    • Drug use: Yes     Types: Inhaled     Comment: thc daily       Family Status   Relation Name Status   • Mo  Alive   • Fa  Alive   • Bro  Alive   • MGMo     • MGFa     • PGMo     • PGFa       Family History   Problem Relation Age of Onset   • No Known Problems Mother    • Prostate cancer Father    • Cancer Father         Kidney CA   • Hypertension Father    • Hypertension Brother    • Heart Disease Maternal Grandmother        ROS:  Review of Systems   Constitutional: Negative for fever, chills, weight loss and malaise/fatigue.   HENT: Negative for ear pain, nosebleeds, congestion, sore throat and neck pain.    Eyes: Negative for blurred vision.   Respiratory: Negative for cough, sputum production, shortness of breath and wheezing.    Cardiovascular: Negative for chest pain, palpitations, orthopnea and leg swelling.   Gastrointestinal: Negative for heartburn, nausea, vomiting and abdominal pain.   Genitourinary: Negative for dysuria, urgency and frequency.   Musculoskeletal: Negative for myalgias, back pain and joint pain.   Skin: Negative for rash and itching.   Neurological: Negative for dizziness, tingling, tremors, sensory change, focal weakness and headaches.   Endo/Heme/Allergies: Does not bruise/bleed easily.   Psychiatric/Behavioral: Negative for suicidal ideas and memory loss.  The patient does not have insomnia.  Positive for depression anxiety.  All other systems reviewed and are negative except as in HPI.    Exam: /70 (BP Location: Left arm, Patient Position: Sitting, BP Cuff Size: Adult)   Pulse 82   Temp 37.3 °C (99.1 °F) (Temporal)   Resp 18   Ht 1.803 m (5' 10.98\")   Wt 68.4 kg (150 lb 12.8 oz)   SpO2 100%  Body mass index is 21.04 kg/m².  General: Normal appearing. No distress.  HEENT: " Normocephalic. Eyes conjunctiva clear lids without ptosis, ears normal shape and contour.  Neck: Supple without JVD or bruit. Thyroid is not enlarged.  Pulmonary: Clear to ausculation.  Normal effort. No rales, ronchi, or wheezing.  Cardiovascular: Regular rate and rhythm without murmur.   Abdomen: Soft, nontender, nondistended.   Neurologic: Grossly nonfocal.  Cranial nerves are normal.  Lymph: No cervical, supraclavicular or axillary lymph nodes are palpable  Skin: Warm and dry.  No rashes or suspicious skin lesions.  Musculoskeletal: Normal gait. No extremity cyanosis, clubbing, or edema.  Psych: Normal mood and affect. Alert and oriented x3. Judgment and insight is normal.    Medical decision-making and discussion:  1. Major depressive disorder with single episode, in full remission (HCC)  2. Anxiety    Patient is here today requesting a letter for his school indicating that he has been diagnosed with depression and anxiety.  He tells me he is in the process of a withdrawal repeal for last semester.  States due to depression and anxiety symptoms his grades dropped last semester. He has been my patient since 3/20/2019 and has been experiencing increased life stressors and family/friend illnesses.      Patient was provided a letter for his school during today's appointment.  Patient verbally consented that diagnosis, treatment, and an explanation of circumstances could be in the letter.     Patient is requesting to discontinue Celexa 10 mg once daily.  Advised patient to the following:  Week 1: Take 5 mg once daily.  Week 2: Take 5 mg once daily.  Week 3: Take 5 mg every other day.  Week 4: Discontinue medication.      Denies any suicidal or homicidal ideation. Emphasized importance of healthy diet and exercise. Discussed that should the patient have any symptoms they should call suicide prevention hotline or report to the emergency room immediately.    Patient will follow-up in 6 weeks for  reevaluation.      Please note that this dictation was created using voice recognition software. I have made every reasonable attempt to correct obvious errors, but I expect that there are errors of grammar and possibly content that I did not discover before finalizing the note.    Assessment/Plan:  1. Major depressive disorder with single episode, in full remission (HCC)     2. Anxiety         Return if symptoms worsen or fail to improve.

## 2020-04-07 ENCOUNTER — OFFICE VISIT (OUTPATIENT)
Dept: URGENT CARE | Facility: PHYSICIAN GROUP | Age: 25
End: 2020-04-07
Payer: COMMERCIAL

## 2020-04-07 ENCOUNTER — HOSPITAL ENCOUNTER (OUTPATIENT)
Dept: LAB | Facility: MEDICAL CENTER | Age: 25
End: 2020-04-07
Attending: PHYSICIAN ASSISTANT
Payer: COMMERCIAL

## 2020-04-07 VITALS
OXYGEN SATURATION: 99 % | HEIGHT: 71 IN | WEIGHT: 140 LBS | SYSTOLIC BLOOD PRESSURE: 122 MMHG | BODY MASS INDEX: 19.6 KG/M2 | DIASTOLIC BLOOD PRESSURE: 98 MMHG | HEART RATE: 132 BPM | TEMPERATURE: 98.8 F

## 2020-04-07 DIAGNOSIS — R10.13 EPIGASTRIC ABDOMINAL PAIN: ICD-10-CM

## 2020-04-07 LAB
ALBUMIN SERPL BCP-MCNC: 5.4 G/DL (ref 3.2–4.9)
ALBUMIN/GLOB SERPL: 2.3 G/DL
ALP SERPL-CCNC: 55 U/L (ref 30–99)
ALT SERPL-CCNC: 24 U/L (ref 2–50)
ANION GAP SERPL CALC-SCNC: 16 MMOL/L (ref 7–16)
AST SERPL-CCNC: 23 U/L (ref 12–45)
BASOPHILS # BLD AUTO: 0.4 % (ref 0–1.8)
BASOPHILS # BLD: 0.04 K/UL (ref 0–0.12)
BILIRUB SERPL-MCNC: 0.9 MG/DL (ref 0.1–1.5)
BUN SERPL-MCNC: 8 MG/DL (ref 8–22)
CALCIUM SERPL-MCNC: 10.1 MG/DL (ref 8.5–10.5)
CHLORIDE SERPL-SCNC: 99 MMOL/L (ref 96–112)
CO2 SERPL-SCNC: 23 MMOL/L (ref 20–33)
CREAT SERPL-MCNC: 0.93 MG/DL (ref 0.5–1.4)
EOSINOPHIL # BLD AUTO: 0.12 K/UL (ref 0–0.51)
EOSINOPHIL NFR BLD: 1.3 % (ref 0–6.9)
ERYTHROCYTE [DISTWIDTH] IN BLOOD BY AUTOMATED COUNT: 36.9 FL (ref 35.9–50)
GLOBULIN SER CALC-MCNC: 2.4 G/DL (ref 1.9–3.5)
GLUCOSE SERPL-MCNC: 106 MG/DL (ref 65–99)
HCT VFR BLD AUTO: 51.3 % (ref 42–52)
HGB BLD-MCNC: 16.5 G/DL (ref 14–18)
IMM GRANULOCYTES # BLD AUTO: 0.02 K/UL (ref 0–0.11)
IMM GRANULOCYTES NFR BLD AUTO: 0.2 % (ref 0–0.9)
LIPASE SERPL-CCNC: 19 U/L (ref 11–82)
LYMPHOCYTES # BLD AUTO: 1.8 K/UL (ref 1–4.8)
LYMPHOCYTES NFR BLD: 20.2 % (ref 22–41)
MCH RBC QN AUTO: 27.4 PG (ref 27–33)
MCHC RBC AUTO-ENTMCNC: 32.2 G/DL (ref 33.7–35.3)
MCV RBC AUTO: 85.2 FL (ref 81.4–97.8)
MONOCYTES # BLD AUTO: 0.54 K/UL (ref 0–0.85)
MONOCYTES NFR BLD AUTO: 6.1 % (ref 0–13.4)
NEUTROPHILS # BLD AUTO: 6.4 K/UL (ref 1.82–7.42)
NEUTROPHILS NFR BLD: 71.8 % (ref 44–72)
NRBC # BLD AUTO: 0 K/UL
NRBC BLD-RTO: 0 /100 WBC
PLATELET # BLD AUTO: 411 K/UL (ref 164–446)
PMV BLD AUTO: 9.3 FL (ref 9–12.9)
POTASSIUM SERPL-SCNC: 3.8 MMOL/L (ref 3.6–5.5)
PROT SERPL-MCNC: 7.8 G/DL (ref 6–8.2)
RBC # BLD AUTO: 6.02 M/UL (ref 4.7–6.1)
SODIUM SERPL-SCNC: 138 MMOL/L (ref 135–145)
WBC # BLD AUTO: 8.9 K/UL (ref 4.8–10.8)

## 2020-04-07 PROCEDURE — 99213 OFFICE O/P EST LOW 20 MIN: CPT | Performed by: PHYSICIAN ASSISTANT

## 2020-04-07 PROCEDURE — 93000 ELECTROCARDIOGRAM COMPLETE: CPT | Performed by: PHYSICIAN ASSISTANT

## 2020-04-07 PROCEDURE — 83690 ASSAY OF LIPASE: CPT

## 2020-04-07 PROCEDURE — 36415 COLL VENOUS BLD VENIPUNCTURE: CPT

## 2020-04-07 PROCEDURE — 85025 COMPLETE CBC W/AUTO DIFF WBC: CPT

## 2020-04-07 PROCEDURE — 80053 COMPREHEN METABOLIC PANEL: CPT

## 2020-04-07 ASSESSMENT — ENCOUNTER SYMPTOMS
EYE DISCHARGE: 0
SHORTNESS OF BREATH: 0
EYE REDNESS: 0
HEMATOCHEZIA: 0
COUGH: 0
NAUSEA: 0
MYALGIAS: 0
FEVER: 0
SORE THROAT: 0
HEADACHES: 0
ABDOMINAL PAIN: 1
CONSTIPATION: 0
VOMITING: 0
DIARRHEA: 0

## 2020-04-08 ENCOUNTER — OFFICE VISIT (OUTPATIENT)
Dept: MEDICAL GROUP | Facility: PHYSICIAN GROUP | Age: 25
End: 2020-04-08
Payer: COMMERCIAL

## 2020-04-08 VITALS — RESPIRATION RATE: 18 BRPM | WEIGHT: 140 LBS | BODY MASS INDEX: 19.6 KG/M2 | HEIGHT: 71 IN

## 2020-04-08 DIAGNOSIS — R10.13 EPIGASTRIC ABDOMINAL PAIN: ICD-10-CM

## 2020-04-08 DIAGNOSIS — F41.1 GAD (GENERALIZED ANXIETY DISORDER): ICD-10-CM

## 2020-04-08 DIAGNOSIS — F51.02 ADJUSTMENT INSOMNIA: ICD-10-CM

## 2020-04-08 PROCEDURE — 99214 OFFICE O/P EST MOD 30 MIN: CPT | Mod: 95,CR | Performed by: PHYSICIAN ASSISTANT

## 2020-04-08 RX ORDER — CITALOPRAM HYDROBROMIDE 10 MG/1
10 TABLET ORAL DAILY
Qty: 90 TAB | Refills: 1 | Status: SHIPPED | OUTPATIENT
Start: 2020-04-08 | End: 2021-07-26

## 2020-04-08 RX ORDER — HYDROXYZINE 50 MG/1
50 TABLET, FILM COATED ORAL
Qty: 90 TAB | Refills: 1 | Status: SHIPPED | OUTPATIENT
Start: 2020-04-08 | End: 2020-07-01

## 2020-04-08 ASSESSMENT — PATIENT HEALTH QUESTIONNAIRE - PHQ9
1. LITTLE INTEREST OR PLEASURE IN DOING THINGS: NEARLY EVERY DAY
8. MOVING OR SPEAKING SO SLOWLY THAT OTHER PEOPLE COULD HAVE NOTICED. OR THE OPPOSITE, BEING SO FIGETY OR RESTLESS THAT YOU HAVE BEEN MOVING AROUND A LOT MORE THAN USUAL: SEVERAL DAYS
SUM OF ALL RESPONSES TO PHQ9 QUESTIONS 1 AND 2: 5
9. THOUGHTS THAT YOU WOULD BE BETTER OFF DEAD, OR OF HURTING YOURSELF: NOT AT ALL
3. TROUBLE FALLING OR STAYING ASLEEP OR SLEEPING TOO MUCH: NEARLY EVERY DAY
2. FEELING DOWN, DEPRESSED, IRRITABLE, OR HOPELESS: MORE THAN HALF THE DAYS
7. TROUBLE CONCENTRATING ON THINGS, SUCH AS READING THE NEWSPAPER OR WATCHING TELEVISION: NOT AT ALL
6. FEELING BAD ABOUT YOURSELF - OR THAT YOU ARE A FAILURE OR HAVE LET YOURSELF OR YOUR FAMILY DOWN: NOT AL ALL
4. FEELING TIRED OR HAVING LITTLE ENERGY: MORE THAN HALF THE DAYS
5. POOR APPETITE OR OVEREATING: SEVERAL DAYS
SUM OF ALL RESPONSES TO PHQ QUESTIONS 1-9: 12

## 2020-04-08 ASSESSMENT — FIBROSIS 4 INDEX: FIB4 SCORE: 0.3

## 2020-04-08 NOTE — PROGRESS NOTES
Telemedicine Visit: Established Patient     This encounter was conducted via Zoom .   Verbal consent was obtained. Patient's identity was verified.    Subjective:   CC: Anxiety, sleep deprivation, and epigastric pain.  Mauricio Mcfadden is a 24 y.o. male presenting for evaluation and management of:    ELLA (generalized anxiety disorder)  Adjustment insomnia  Epigastric abdominal pain  Chronic but worsening problem.  Patient states he is stressed again.  He tells me that he recently lost 2 family members to COVID-19.  States his mom and dad are losing their home and will have to move out after coded 19 pandemic has resolved.  Patient's father has prostate cancer and he tells me that his mother and father were unable to make their house payments due to medical bills.  He tells me that he was told that he was going to be evicted from his home but found out today that he is not going to be evicted.  States he is having a few panic attacks a week and when he has panic attacks he practices positive talk and tries to go outside to get fresh air.  During panic attacks he experiences rapid breathing, fast heart rate, and feelings of impending doom.  He mentions 3 days ago he tried to smoke marijuana again to see if it help alleviate symptoms but he found that it exacerbated his anxiety symptoms.  States he has been trying to exercise.  States exercise routine consist of walking his dog at least 45 minutes a day.  Patient states he just found out that he will be able to work from home.  States he still attending college and his grades are good.  He denies homicidal or suicidal ideation.  He does tell me that yesterday he was seen in urgent care for epigastric pain.  Lab work was ordered to further evaluate patient for pancreatitis.  Discussed with patient lab work results were negative for pancreatitis.  Patient is relieved by this news.  He does tell me that when he is anxious he developed epigastric pain.     Patient would like  to be placed back on Celexa and treated for sleep deprivation symptoms.      ROS   Denies any recent fevers or chills. No nausea or vomiting. No chest pains or shortness of breath.     No Known Allergies    Current medicines (including changes today)  Current Outpatient Medications   Medication Sig Dispense Refill   • citalopram (CELEXA) 10 MG tablet Take 1 Tab by mouth every day. 90 Tab 1   • hydrOXYzine HCl (ATARAX) 50 MG Tab Take 1 Tab by mouth every bedtime. 90 Tab 1   • albuterol (VENTOLIN OR PROVENTIL) 108 (90 BASE) MCG/ACT Aero Soln inhalation aerosol Inhale 2 Puffs by mouth every 6 hours as needed for Shortness of Breath.       No current facility-administered medications for this visit.        Patient Active Problem List    Diagnosis Date Noted   • Anxiety 04/15/2019   • Major depressive disorder with single episode, in full remission (HCC) 04/15/2019   • Asthma 03/20/2019       Family History   Problem Relation Age of Onset   • No Known Problems Mother    • Prostate cancer Father    • Cancer Father         Kidney CA   • Hypertension Father    • Hypertension Brother    • Heart Disease Maternal Grandmother        He  has a past medical history of Asthma.  He  has a past surgical history that includes dental extraction(s) (2018).       Objective:   Vitals obtained by patient:  Respirations through observation: 18, Height: 5'10.98in and Weight: 140lb    Physical Exam:  Constitutional: Alert and well-groomed.  Patient is anxious.  Skin: No rashes in visible areas.  Eye: Round. Conjunctiva clear, lids normal. No icterus.   ENMT: Lips pink without lesions, good dentition, moist mucous membranes. Phonation normal.  Neck: No masses, no thyromegaly. Moves freely without pain.  CV: Pulse as reported by patient  Respiratory: Unlabored respiratory effort, no cough or audible wheeze  Psych: Alert and oriented x3, normal affect and mood.       Assessment and Plan:   The following treatment plan was discussed:     1. ELLA  (generalized anxiety disorder)  Patient has been prescribed Celexa milligrams tab once daily.  Patient was given written instructions and advised to the following:  Week 1: Take a half a tablet of Celexa 10 mg by mouth once daily.  Week 2: Take a full tablet of Celexa 10 mg once daily.    Patient will follow-up on week 2 for reevaluation.     Discussed common side effects and adverse reactions of medication with patient.  Advised patient he may experience that effect for 2+ weeks but side effect symptoms should subside and he should start feel the benefits of the medication we will weeks 4-6.  Advised patient continue work on diet, exercise, sleep hygiene, and developing healthy coping mechanisms for stress and anxiety.  Crease caffeine consumption. Avoid known triggers.     Denies any suicidal or homicidal ideation. Discussed that should the patient have any symptoms they should call suicide prevention hotline or report to the emergency room immediately.    - citalopram (CELEXA) 10 MG tablet; Take 1 Tab by mouth every day.  Dispense: 90 Tab; Refill: 1    2. Adjustment insomnia  Patient has been prescribed hydroxyzine 50 mg tab once daily.  Advised patient initiating medication take half a tablet by mouth once nightly 30 to 60 minutes prior to bedtime. Advised patient he can take over-the-counter 2.5-5 mg of melatonin in combination with hydroxyzine.  If he finds a 25 mg tab once nightly does not alleviate symptoms to increase to 50 mg tab once nightly.  Advised patient he can increase up to 100 mg tab once nightly.  Discussed with patient he can also take up to 10 mg of melatonin once nightly.  Discussed common side effects adverse reactions medication with patient.  Patient will follow-up in 3 weeks for evaluation.  Continue working on diet, exercise, sleep hygiene, and developing healthy coping mechanisms for stress anxiety.      - hydrOXYzine HCl (ATARAX) 50 MG Tab; Take 1 Tab by mouth every bedtime.  Dispense:  90 Tab; Refill: 1    3. Epigastric abdominal pain  Discussed with patient recent lab work results not indicate pancreatitis.   Advised patient to continue developing healthy coping mechanisms for stress anxiety.  Continue work on diet, exercise, sleep hygiene and avoid irritants.  Use OTC medicines such as TUMS, Rolaids, Maalox, Gaviscon, Zantac, Pepcid, Tagamet.     Follow-up for worsening symptoms,lack of expected recovery, or should new symptoms or problems arise.    Patient will follow-up in 2 weeks for evaluation.    Follow-up: Return in about 2 weeks (around 4/22/2020), or if symptoms worsen or fail to improve.

## 2020-04-22 ENCOUNTER — APPOINTMENT (OUTPATIENT)
Dept: MEDICAL GROUP | Facility: PHYSICIAN GROUP | Age: 25
End: 2020-04-22
Payer: COMMERCIAL

## 2020-04-28 ENCOUNTER — TELEMEDICINE (OUTPATIENT)
Dept: MEDICAL GROUP | Facility: PHYSICIAN GROUP | Age: 25
End: 2020-04-28
Payer: COMMERCIAL

## 2020-04-28 VITALS — WEIGHT: 143 LBS | RESPIRATION RATE: 18 BRPM | BODY MASS INDEX: 20.02 KG/M2 | HEIGHT: 71 IN | TEMPERATURE: 97.7 F

## 2020-04-28 DIAGNOSIS — F41.9 ANXIETY: ICD-10-CM

## 2020-04-28 DIAGNOSIS — F41.1 GAD (GENERALIZED ANXIETY DISORDER): ICD-10-CM

## 2020-04-28 PROCEDURE — 99213 OFFICE O/P EST LOW 20 MIN: CPT | Mod: 95,CR | Performed by: PHYSICIAN ASSISTANT

## 2020-04-28 ASSESSMENT — FIBROSIS 4 INDEX: FIB4 SCORE: 0.3

## 2020-04-28 NOTE — PROGRESS NOTES
Telemedicine Visit: Established Patient     This encounter was conducted via Zoom .   Verbal consent was obtained. Patient's identity was verified.    Subjective:   CC: Anxiety and depression   Mauricio Mcfadden is a 24 y.o. male presenting for evaluation and management of:    Patient is a pleasant 24-year-old male here today to follow-up on anxiety and depression.  During her last appointment patient was prescribed Celexa 10 mg tab once daily.  He tells me he has been taking medication compliantly and overall mood has drastically improved.  States he is feeling well.  He does mention that he has been experiencing dry mouth and feeling thirsty since starting medication.  Patient would like to continue medication for now.  States since her last appointment he has been walking his dogs regularly and is also sleeping better.  He denies homicidal or suicidal ideation.    ROS   Denies any recent fevers or chills. No nausea or vomiting. No chest pains or shortness of breath.     No Known Allergies    Current medicines (including changes today)  Current Outpatient Medications   Medication Sig Dispense Refill   • citalopram (CELEXA) 10 MG tablet Take 1 Tab by mouth every day. 90 Tab 1   • hydrOXYzine HCl (ATARAX) 50 MG Tab Take 1 Tab by mouth every bedtime. (Patient not taking: Reported on 4/28/2020) 90 Tab 1   • albuterol (VENTOLIN OR PROVENTIL) 108 (90 BASE) MCG/ACT Aero Soln inhalation aerosol Inhale 2 Puffs by mouth every 6 hours as needed for Shortness of Breath.       No current facility-administered medications for this visit.        Patient Active Problem List    Diagnosis Date Noted   • Anxiety 04/15/2019   • Major depressive disorder with single episode, in full remission ([facility]) 04/15/2019   • Asthma 03/20/2019       Family History   Problem Relation Age of Onset   • No Known Problems Mother    • Prostate cancer Father    • Cancer Father         Kidney CA   • Hypertension Father    • Hypertension Brother    • Heart  "Disease Maternal Grandmother        He  has a past medical history of Asthma.  He  has a past surgical history that includes dental extraction(s) (2018).       Objective:   Vitals obtained by patient:     4/28/20 10:39 AM      Resp  18     Temp  36.5 C ( 97.7 F)     Temp src  Temporal     Weight   64.9 kg (143 lb)     Height  1.803 m (5' 10.98\")          Physical Exam:  Constitutional: Alert, no distress, well-groomed.  Skin: No rashes in visible areas.  Eye: Round. Conjunctiva clear, lids normal. No icterus.   ENMT: Lips pink without lesions, good dentition, moist mucous membranes. Phonation normal.  Neck: No masses, no thyromegaly. Moves freely without pain.  CV: Pulse as reported by patient  Respiratory: Unlabored respiratory effort, no cough or audible wheeze  Psych: Alert and oriented x3, normal affect and mood.       Assessment and Plan:   The following treatment plan was discussed:     1. Anxiety  2. ELLA (generalized anxiety disorder)  Patient is feeling well on current medications. Will continue.  He did mention during today's appointment that he has been experiencing dry mouth symptoms since starting Celexa.  Patient will follow-up in 2 weeks for reevaluation.  If he is still experiencing a potential unpleasant side effect we will discuss changing medication.  Denies any suicidal or homicidal ideation. Emphasized importance of healthy diet and exercise. Discussed that should the patient have any symptoms they should call suicide prevention hotline or report to the emergency room immediately.          Follow-up: Return in about 2 weeks (around 5/12/2020).           "

## 2020-05-12 ENCOUNTER — TELEMEDICINE (OUTPATIENT)
Dept: MEDICAL GROUP | Facility: PHYSICIAN GROUP | Age: 25
End: 2020-05-12
Payer: COMMERCIAL

## 2020-05-12 VITALS — HEIGHT: 71 IN | BODY MASS INDEX: 20.44 KG/M2 | WEIGHT: 146 LBS | RESPIRATION RATE: 18 BRPM

## 2020-05-12 DIAGNOSIS — F41.1 GAD (GENERALIZED ANXIETY DISORDER): ICD-10-CM

## 2020-05-12 PROCEDURE — 99212 OFFICE O/P EST SF 10 MIN: CPT | Mod: 95,CR | Performed by: PHYSICIAN ASSISTANT

## 2020-05-12 ASSESSMENT — FIBROSIS 4 INDEX: FIB4 SCORE: 0.3

## 2020-05-12 NOTE — PROGRESS NOTES
Telemedicine Visit: Established Patient     This encounter was conducted via Zoom .   Verbal consent was obtained. Patient's identity was verified.    Subjective:   CC: Joon Gonsalves is a 24 y.o. male presenting for evaluation and management of:    Patient is a pleasant 24-year-old male here today follow-up on anxiety.  He is currently taking Celexa 10 mg tab once daily.  States he is compliant with medication and experiences no side effects or complications medication.  Dinner last appointment on 4/28/2020 patient was complaining of experiencing dry mouth and felt it was due to the medication but states a few days after appointment symptoms resolved.  He tells me anxiety has drastically improved.  States he is sleeping and eating better.  Denies acute panic attacks.  Denies homicidal or suicidal ideation.  He tells me that he is exercising regularly.  Patient like to continue Celexa.    ROS   Denies any recent fevers or chills. No nausea or vomiting. No chest pains or shortness of breath.     No Known Allergies    Current medicines (including changes today)  Current Outpatient Medications   Medication Sig Dispense Refill   • citalopram (CELEXA) 10 MG tablet Take 1 Tab by mouth every day. 90 Tab 1   • hydrOXYzine HCl (ATARAX) 50 MG Tab Take 1 Tab by mouth every bedtime. (Patient not taking: Reported on 4/28/2020) 90 Tab 1   • albuterol (VENTOLIN OR PROVENTIL) 108 (90 BASE) MCG/ACT Aero Soln inhalation aerosol Inhale 2 Puffs by mouth every 6 hours as needed for Shortness of Breath.       No current facility-administered medications for this visit.        Patient Active Problem List    Diagnosis Date Noted   • Anxiety 04/15/2019   • Major depressive disorder with single episode, in full remission (HCC) 04/15/2019   • Asthma 03/20/2019       Family History   Problem Relation Age of Onset   • No Known Problems Mother    • Prostate cancer Father    • Cancer Father         Kidney CA   • Hypertension Father    •  "Hypertension Brother    • Heart Disease Maternal Grandmother        He  has a past medical history of Asthma.  He  has a past surgical history that includes dental extraction(s) (2018).       Objective:   Vitals obtained by patient:   5/12/20 10:07 AM      Resp  18     Weight   66.2 kg (146 lb)     Height  1.803 m (5' 10.98\")          Physical Exam:  Constitutional: Alert, no distress, well-groomed.  Skin: No rashes in visible areas.  Eye: Round. Conjunctiva clear, lids normal. No icterus.   ENMT: Lips pink without lesions, good dentition, moist mucous membranes. Phonation normal.  Neck: No masses, no thyromegaly. Moves freely without pain.  CV: Pulse as reported by patient  Respiratory: Unlabored respiratory effort, no cough or audible wheeze  Psych: Alert and oriented x3, normal affect and mood.       Assessment and Plan:   The following treatment plan was discussed:     1. ELLA (generalized anxiety disorder)  Patient is feeling well on current medications. Will continue. Denies any suicidal or homicidal ideation. Emphasized importance of healthy diet and exercise. Discussed that should the patient have any symptoms they should call suicide prevention hotline or report to the emergency room immediately.  Continue developing healthy coping mechanisms for stress and anxiety.    Follow-up for worsening symptoms,lack of expected recovery, or should new symptoms or problems arise.      Follow-up: Return in about 6 weeks (around 6/23/2020).           "

## 2020-07-01 ENCOUNTER — HOSPITAL ENCOUNTER (EMERGENCY)
Facility: MEDICAL CENTER | Age: 25
End: 2020-07-01
Attending: EMERGENCY MEDICINE
Payer: COMMERCIAL

## 2020-07-01 VITALS
WEIGHT: 160 LBS | DIASTOLIC BLOOD PRESSURE: 79 MMHG | HEART RATE: 98 BPM | HEIGHT: 72 IN | RESPIRATION RATE: 16 BRPM | TEMPERATURE: 99.3 F | BODY MASS INDEX: 21.67 KG/M2 | OXYGEN SATURATION: 99 % | SYSTOLIC BLOOD PRESSURE: 128 MMHG

## 2020-07-01 DIAGNOSIS — F10.920 ALCOHOLIC INTOXICATION WITHOUT COMPLICATION (HCC): ICD-10-CM

## 2020-07-01 DIAGNOSIS — R45.851 SUICIDAL IDEATION: ICD-10-CM

## 2020-07-01 LAB
AMPHET UR QL SCN: NEGATIVE
BARBITURATES UR QL SCN: NEGATIVE
BENZODIAZ UR QL SCN: NEGATIVE
BZE UR QL SCN: NEGATIVE
CANNABINOIDS UR QL SCN: POSITIVE
METHADONE UR QL SCN: NEGATIVE
OPIATES UR QL SCN: NEGATIVE
OXYCODONE UR QL SCN: NEGATIVE
PCP UR QL SCN: NEGATIVE
POC BREATHALIZER: 0.12 PERCENT (ref 0–0.01)
POC BREATHALIZER: 0.15 PERCENT (ref 0–0.01)
POC BREATHALIZER: 0.28 PERCENT (ref 0–0.01)
PROPOXYPH UR QL SCN: NEGATIVE

## 2020-07-01 PROCEDURE — 80307 DRUG TEST PRSMV CHEM ANLYZR: CPT

## 2020-07-01 PROCEDURE — 99285 EMERGENCY DEPT VISIT HI MDM: CPT

## 2020-07-01 PROCEDURE — 302970 POC BREATHALIZER: Performed by: EMERGENCY MEDICINE

## 2020-07-01 PROCEDURE — 302970 POC BREATHALIZER

## 2020-07-01 PROCEDURE — 90791 PSYCH DIAGNOSTIC EVALUATION: CPT

## 2020-07-01 PROCEDURE — A9270 NON-COVERED ITEM OR SERVICE: HCPCS | Performed by: EMERGENCY MEDICINE

## 2020-07-01 PROCEDURE — 700102 HCHG RX REV CODE 250 W/ 637 OVERRIDE(OP): Performed by: EMERGENCY MEDICINE

## 2020-07-01 RX ORDER — CETIRIZINE HYDROCHLORIDE 10 MG/1
10 TABLET ORAL DAILY
Status: DISCONTINUED | OUTPATIENT
Start: 2020-07-01 | End: 2020-07-01 | Stop reason: HOSPADM

## 2020-07-01 RX ORDER — CETIRIZINE HYDROCHLORIDE 10 MG/1
10 TABLET ORAL DAILY
Status: SHIPPED | COMMUNITY
End: 2022-04-28

## 2020-07-01 RX ORDER — CITALOPRAM 20 MG/1
10 TABLET ORAL DAILY
Status: DISCONTINUED | OUTPATIENT
Start: 2020-07-01 | End: 2020-07-01 | Stop reason: HOSPADM

## 2020-07-01 RX ORDER — ALBUTEROL SULFATE 90 UG/1
1-2 AEROSOL, METERED RESPIRATORY (INHALATION)
Status: DISCONTINUED | OUTPATIENT
Start: 2020-07-01 | End: 2020-07-01 | Stop reason: HOSPADM

## 2020-07-01 RX ORDER — ALBUTEROL SULFATE 90 UG/1
1-2 AEROSOL, METERED RESPIRATORY (INHALATION) EVERY 4 HOURS PRN
Status: SHIPPED | COMMUNITY
End: 2022-04-28

## 2020-07-01 RX ADMIN — CETIRIZINE HYDROCHLORIDE 10 MG: 10 TABLET, FILM COATED ORAL at 10:40

## 2020-07-01 ASSESSMENT — FIBROSIS 4 INDEX: FIB4 SCORE: 0.3

## 2020-07-01 NOTE — ED TRIAGE NOTES
"Pt BIB PD on L2K after threatening suicide to his gf and loading a Glock.  GF called 911.  Pt relates he \"[id number]@#ked up\" by saying that and currently denies any SI/HI.  "

## 2020-07-01 NOTE — ED PROVIDER NOTES
ED Provider Note        Patient was signed out to me for reevaluation.  [facility] has evaluate the patient and he states that he is not suicidal or homicidal.  There is long conversation with the patient and the patient's significant other and I do both believe the patient safe at home.  For this reason, the patient was taken off his legal 2000 and sent home.

## 2020-07-01 NOTE — ED NOTES
Pt requests call to his boss to let him know he isnt going to make it to work.  Msg left on voicemail.

## 2020-07-01 NOTE — DISCHARGE INSTRUCTIONS
You were evaluated today for suicidal ideation. Your physical exam and labs were reassuring. You were medically cleared in the emergency department, had a psychiatric evaluation performed and you are being discharged from the emergency department. Please follow all the recommendations set by your psychiatrist.    If you have thoughts of suicide, please seek help. This may be a family member, friend, mental health professional, suicide hotline, or any emergency department.     National Suicide Prevention Lifeline: 1-656.348.1813  Available 24hours a day, 7 days a week    You were seen in the ED today for alcohol intoxication. While here you were examined and allowed to sober up while we monitored you. Your physical exam was reassuring. If you continue to drink you will be placing your health at risk. At this time you are sober and can return home.    Please be alert for signs of alcohol withdrawal, including shaking hands, agitation, feeling pins and needles, nausea, vomiting, headache. Please seek medical care if you develop these. Alcohol withdrawal can be dangerous and even lead to seizures or death if untreated.      Please return to the ED or seek medical attention if you develop:  Fever, severe headache, vomiting, thoughts of suicide or other concerning findings

## 2020-07-01 NOTE — ED PROVIDER NOTES
ED Provider Note    Addendum    Patient received at signout from Dr. Barry awaiting reevaluation for sobriety.  Patient arrives on legal hold, given the access to a gun and loading of the gun, patient will require alert team evaluation, and possibly inpatient stabilization.    6:15 AM  Patient continues to be intoxicated, not ready for evaluation.  Patient will be signed out to Dr. Hu awaiting psychiatric evaluation.      Encounter diagnoses:  1. Alcoholic intoxication without complication (HCC)     2. Suicidal ideation

## 2020-07-01 NOTE — ED NOTES
Patient's home medications have been reviewed by the pharmacy team.     Past Medical History:   Diagnosis Date   • Asthma        Patient's Medications   New Prescriptions    No medications on file   Previous Medications    ALBUTEROL 108 (90 BASE) MCG/ACT AERO SOLN INHALATION AEROSOL    Inhale 1-2 Puffs by mouth every four hours as needed for Shortness of Breath.    CETIRIZINE (ZYRTEC) 10 MG TAB    Take 10 mg by mouth every day.    CITALOPRAM (CELEXA) 10 MG TABLET    Take 1 Tab by mouth every day.   Modified Medications    No medications on file   Discontinued Medications    ALBUTEROL (VENTOLIN OR PROVENTIL) 108 (90 BASE) MCG/ACT AERO SOLN INHALATION AEROSOL    Inhale 2 Puffs by mouth every 6 hours as needed for Shortness of Breath.    HYDROXYZINE HCL (ATARAX) 50 MG TAB    Take 1 Tab by mouth every bedtime.          A:  Med rec updated    Medications do not appear to be contributing to current complaints.     P:    No recommendations at this time. Home medications have been reordered as appropriate.    Ken Becerril, PharmD

## 2020-07-01 NOTE — ED PROVIDER NOTES
"ED Provider Note    CHIEF COMPLAINT  Chief Complaint   Patient presents with   • Suicidal Ideation       HPI  Van Khari Mcfadden is a 24 y.o. male who presents to emerge department with suicidal commentary to significant other.  He explains a he has had significant life stress including parents losing their house, ongoing fights with his brother and also distressed between he and his significant other to which he lives with.  Tonight he explains that he was \"mouthing off \"and said \"stupid things \"after drinking alcohol.  He states that he frankly was not suicidal and was only making these comments to incite further commentary from her.  Denies prior suicidality.    REVIEW OF SYSTEMS  See HPI for further details. All other systems are negative.     PAST MEDICAL HISTORY   has a past medical history of Asthma.    SOCIAL HISTORY  Social History     Tobacco Use   • Smoking status: Never Smoker   • Smokeless tobacco: Never Used   Substance and Sexual Activity   • Alcohol use: Yes     Alcohol/week: 0.0 oz     Comment: 4 drinks per week.    • Drug use: Yes     Types: Inhaled     Comment: thc daily   • Sexual activity: Yes     Partners: Female     Comment: long-term relationship.        SURGICAL HISTORY   has a past surgical history that includes dental extraction(s) (2018).    CURRENT MEDICATIONS  Home Medications    **Home medications have not yet been reviewed for this encounter**         ALLERGIES  No Known Allergies    PHYSICAL EXAM  VITAL SIGNS: /91   Pulse (!) 102   Temp 36.8 °C (98.2 °F) (Temporal)   Resp 18   Ht 1.829 m (6')   Wt 72.6 kg (160 lb)   SpO2 97%   BMI 21.70 kg/m²  @ANA[[id number]::@  Pulse ox interpretation: I interpret this pulse ox as normal.  Constitutional: Alert in no apparent distress.  HENT: Normocephalic, Atraumatic, Bilateral external ears normal. Nose normal.   Eyes: Pupils are equal and reactive. Conjunctiva normal, non-icteric.   Heart: Regular rate and rythm, no murmurs.  "   Lungs: Clear to auscultation bilaterally.  Skin: Warm, Dry, No erythema, No rash.   Neurologic: Alert, Grossly non-focal.   Psychiatric: Tearful, depressed, no SI HI.    Results for orders placed or performed during the hospital encounter of 07/01/20   URINE DRUG SCREEN   Result Value Ref Range    Amphetamines Urine Negative Negative    Barbiturates Negative Negative    Benzodiazepines Negative Negative    Cocaine Metabolite Negative Negative    Methadone Negative Negative    Opiates Negative Negative    Oxycodone Negative Negative    Phencyclidine -Pcp Negative Negative    Propoxyphene Negative Negative    Cannabinoid Metab Positive (A) Negative   POC BREATHALIZER   Result Value Ref Range    POC Breathalizer 0.275 (A) 0.00 - 0.01 Percent         COURSE & MEDICAL DECISION MAKING  Pertinent Labs & Imaging studies reviewed. (See chart for details)  Patient brought in after police were called due to significant other reported the patient had been making suicidal comments.  Patient admits to these, however states that he was not frankly suicidal.  Significantly intoxicated as was initially discussed with the patient.  While the patient is intoxicated at this point I do not believe that the patient is at significant high risk.  Patient will need ongoing reevaluation once sober.  Patient found disposition will be in accordance with interview at the time of sobriety.        FINAL IMPRESSION  Suicidal commentary  Alcohol intoxication         Electronically signed by: Lalito Barry M.D., 7/1/2020 1:10 AM

## 2020-07-01 NOTE — CONSULTS
"[facility]   TRIAGE ASSESSMENT    Name: Mauricio Mcfadden  MRN: [id number]  : 1995  Age: 24 y.o.  Date of assessment: 2020  PCP: Ca Avalos P.A.-C.  Persons in attendance: Patient    CHIEF COMPLAINT/PRESENTING ISSUE (as stated by patient): 24 year old male BIB PD this AM, legal hod, SI last night  to shoot self with gun he owns; ETOH breathalyzer on admit this AM 0.275; later today at  1100, ETOH breathalyzer 0.08; pt alert, oriented x 4; calm behaviors; cooperative; with organized thoughts and behaviors; no delusions, paranoia, hallucinations noted; pt currently denies SI, HI, or self-harm ideation; future-oriented; no intent to hurt self; insight, judgment adequate; pt states \"I was not going to hurt myself, I was bull shitting my girlfriend, we had an argument about my drinking, was with a friend, went home,she was mad at me for being drunk, I don't know how it escalated, I told her I would kill myself, I would not do that, I was trying to scare her\"; states current substance use includes ETOH up to 8 shots liquor 2 x week with last use 2020, THC carty with last use 2020; states he is having \"family issues\" including his parents in [place], [place] are losing their house and his father has cancer; pt denies h/o self-harm or SA; denies h/oi inpt MH or CD tx; denies current outpt MH providers; currently taking RX Celexa 10 mg PO daily prescribed by pt's PCP Dr. Ca Avalos, last appt 2020; psych diagnoses include Anxiety, Major Depressive D/O; pt states he lives with his girlfriend of 8 years; currently works full-time as a  at a [employer] and is completing an MATTHIAS program at [facility]; pt identifies positive support systems as his girlfriend, Yvonne, and his parents in [place], [place]  Writer RN spoke to pt's girlfriend, Yvonne, on the phone, [phone], who states she can remove pt' 3 guns from the residence; Yvonne does not state safety concerns r/t pt returning " home today; writer RN spoke to [place] police dept who verified they cannot remove guns from a private residence         Chief Complaint   Patient presents with   • Suicidal Ideation   • Alcohol Intoxication        CURRENT LIVING SITUATION/SOCIAL SUPPORT:   pt states he lives with his girlfriend of 8 years;; pt identifies positive support systems as his girlfriend, Yvonne, and his parents in [place], [place]; PCP  Dr. Ca Avalos at [facility]    BEHAVIORAL HEALTH TREATMENT HISTORY  Does patient/parent report a history of prior behavioral health treatment for patient?   Yes:    Dates Level of Care Facilty/Provider Diagnosis/Problem Medications   currently Primary Care Physician Dr. Ca Avalos at [facility]  Anxiety, Major Depressive D/O Celexa 10 mg PO daily         SAFETY ASSESSMENT - SELF  Does patient acknowledge current or past symptoms of dangerousness to self? Yes-last night,  SI; to shoot self with gun he owns, ETOH intoxication  Does parent/significant other report patient has current or past symptoms of dangerousness to self? N\A  Does presenting problem suggest symptoms of dangerousness to self? Yes:     Past Current    Suicidal Thoughts: [x]  []    Suicidal Plans: [x]  []    Suicidal Intent: []  []    Suicide Attempts: []  []    Self-Injury []  []      For any boxes checked above, provide detail: today  SI; to shoot self with gun he owns, ETOH intoxication      History of suicide by family member: no  History of suicide by friend/significant other: no  Recent change in frequency/specificity/intensity of suicidal thoughts or self-harm behavior? yes - last night  Current access to firearms, medications, or other identified means of suicide/self-harm? yes - owns 3 guns  If yes, willing to restrict access to means of suicide/self-harm? yes - girlfriend to remove pt's guns from the residence  Protective factors present:  Future-oriented, Hopefulness, Optimism, Positive  "coping skills, Positive self-efficacy, Strong family connections, Actively engaged in treatment and Willing to address in treatment    SAFETY ASSESSMENT - OTHERS  Does patient acknowledge current or past symptoms of aggressive behavior or risk to others? no  Does parent/significant other report patient has current or past symptoms of aggressive behavior or risk to others?  N\A  Does presenting problem suggest symptoms of dangerousness to others? No    Crisis Safety Plan completed and copy given to patient? yes    ABUSE/NEGLECT SCREENING  Does patient report feeling “unsafe” in his/her home, or afraid of anyone?  no  Does patient report any history of physical, sexual, or emotional abuse?  no  Does parent or significant other report any of the above? N\A  Is there evidence of neglect by self?  no  Is there evidence of neglect by a caregiver? no  Does the patient/parent report any history of CPS/APS/police involvement related to suspected abuse/neglect or domestic violence? no  Based on the information provided during the current assessment, is a mandated report of suspected abuse/neglect being made?  No    SUBSTANCE USE SCREENING  Yes:  Ankush all substances used in the past 30 days:      Last Use Amount   [x]   Alcohol 6/30/2020 8 shots liquor   [x]   Marijuana 6/29/2020 daily   []   Heroin     []   Prescription Opioids  (used without prescription, for    recreation, or in excess of prescribed amount)     []   Other Prescription  (used without prescription, for    recreation, or in excess of prescribed amount)     []   Cocaine      []   Methamphetamine     []   \"\" drugs (ectasy, MDMA)     []   Other substances        UDS results + THC  Breathalyzer results: : 0.275    What consequences does the patient associate with any of the above substance use and or addictive behaviors? Relationship problems:     Risk factors for detox (check all that apply):  []  Seizures   []  Diaphoretic (sweating)   []  Tremors   []  " Hallucinations   []  Increased blood pressure   []  Decreased blood pressure   []  Other   [x]  None      [] Patient education on risk factors for detoxification and instructed to return to ER as needed.      MENTAL STATUS   Participation: Active verbal participation, Attentive, Engaged and Open to feedback  Grooming: Casual and Neat  Orientation: Alert and Fully Oriented  Behavior: Calm  Eye contact: Good  Mood: Anxious  Affect: Flat, Congruent with content and Anxious  Thought process: Logical, Goal-directed and Circumstantial  Thought content: Within normal limits  Speech: Rate within normal limits and Volume within normal limits  Perception: Within normal limits  Memory:  No gross evidence of memory deficits  Insight: Adequate  Judgment:  Adequate  Other:    Collateral information:   Source:  [] Significant other present in person:   [] Significant other by telephone  [] [facility]   [x] [facility] Nursing Staff  [x] [facility] Medical Record  [x] Other:  Pt's girlfriendYvonne, [phone]    [] Unable to complete full assessment due to:  [] Acute intoxication  [] Patient declined to participate/engage  [] Patient verbally unresponsive  [] Significant cognitive deficits  [] Significant perceptual distortions or behavioral disorganization  [] Other:      CLINICAL IMPRESSIONS:  Primary:  D  Secondary:         IDENTIFIED NEEDS/PLAN:  [Trigger DISPOSITION list for any items marked]    []  Imminent safety risk - self [] Imminent safety risk - others   []  Acute substance withdrawal []  Psychosis/Impaired reality testing   [x]  Mood/anxiety []  Substance use/Addictive behavior   [x]  Maladaptive behaviro []  Parent/child conflict   []  Family/Couples conflict []  Biomedical   []  Housing []  Financial   []   Legal  Occupational/Educational   []  Domestic violence []  Other:     Disposition: Refer to [facility], [facility], Primary Care Physician, [facility] and  Employee Assistance Program, [facility] ([place], [place]) mental health clinic; [employer] health insurance plan; writer RN reviewed community  resources with  pt, with written information given; writer RN to send pt referral to [facility] outpt MH; pt verbalized understanding; pt to DC to self today    Does patient express agreement with the above plan? yes    Referral appointment(s) scheduled? no    Alert team only:   I have discussed findings and recommendations with Dr. Hu who is in agreement with these recommendations. Legal hold discontinued    Referral information sent to the following community providers :    If applicable : Referred  to :STEPHEN Fleming R.N.  7/1/2020

## 2020-07-06 ENCOUNTER — PATIENT MESSAGE (OUTPATIENT)
Dept: MEDICAL GROUP | Facility: PHYSICIAN GROUP | Age: 25
End: 2020-07-06

## 2020-07-10 ENCOUNTER — OFFICE VISIT (OUTPATIENT)
Dept: MEDICAL GROUP | Facility: PHYSICIAN GROUP | Age: 25
End: 2020-07-10
Payer: COMMERCIAL

## 2020-07-10 VITALS
WEIGHT: 148.6 LBS | HEART RATE: 96 BPM | TEMPERATURE: 99 F | OXYGEN SATURATION: 99 % | RESPIRATION RATE: 16 BRPM | HEIGHT: 71 IN | SYSTOLIC BLOOD PRESSURE: 132 MMHG | BODY MASS INDEX: 20.8 KG/M2 | DIASTOLIC BLOOD PRESSURE: 88 MMHG

## 2020-07-10 DIAGNOSIS — F32.5 MAJOR DEPRESSIVE DISORDER WITH SINGLE EPISODE, IN FULL REMISSION (HCC): ICD-10-CM

## 2020-07-10 DIAGNOSIS — F41.9 ANXIETY: ICD-10-CM

## 2020-07-10 PROCEDURE — 99214 OFFICE O/P EST MOD 30 MIN: CPT | Performed by: PHYSICIAN ASSISTANT

## 2020-07-10 ASSESSMENT — FIBROSIS 4 INDEX: FIB4 SCORE: 0.3

## 2020-07-10 NOTE — PROGRESS NOTES
Chief Complaint   Patient presents with   • Depression     follow up        HISTORY OF PRESENT ILLNESS: Mauricio Mcfadden is an established 24 y.o. male here to discuss the evaluation and management of:    Is a pleasant 24-year-old male here today to follow-up on anxiety and depression.  Patient was recently seen in the emergency room on 7/1/202 suicidal commentary to significant other.  He tells me that he was intoxicated and was angrily speaking.  States he had no plan of suicide.  Denies homicidal ideation.  States he is ashamed of his actions.  He tells me he is taken Celexa as prescribed and denies side effects or complications medication.  He feels that he does manage anxiety symptoms as well as depression symptoms.  He admits that he has been feeling stressed out lately.  States his partner have been arguing and his dad's health has declined and his parents are losing their home.  Patient states he is developing healthy coping mechanisms for stress anxiety.  He denies homicidal or suicidal ideation.      Patient Active Problem List    Diagnosis Date Noted   • Anxiety 04/15/2019   • Major depressive disorder with single episode, in full remission ([facility]) 04/15/2019   • Asthma 03/20/2019       Allergies:Patient has no known allergies.    Current Outpatient Medications   Medication Sig Dispense Refill   • albuterol 108 (90 Base) MCG/ACT Aero Soln inhalation aerosol Inhale 1-2 Puffs by mouth every four hours as needed for Shortness of Breath.     • cetirizine (ZYRTEC) 10 MG Tab Take 10 mg by mouth every day.     • citalopram (CELEXA) 10 MG tablet Take 1 Tab by mouth every day. 90 Tab 1     No current facility-administered medications for this visit.        Social History     Tobacco Use   • Smoking status: Never Smoker   • Smokeless tobacco: Never Used   Substance Use Topics   • Alcohol use: Yes     Alcohol/week: 0.0 oz     Comment: 4 drinks per week.    • Drug use: Yes     Types: Inhaled     Comment: thc  "daily       Family Status   Relation Name Status   • Mo  Alive   • Fa  Alive   • Bro  Alive   • MGMo     • MGFa     • PGMo     • PGFa       Family History   Problem Relation Age of Onset   • No Known Problems Mother    • Prostate cancer Father    • Cancer Father         Kidney CA   • Hypertension Father    • Hypertension Brother    • Heart Disease Maternal Grandmother        ROS:  Review of Systems   Constitutional: Negative for fever, chills, weight loss and malaise/fatigue.   HENT: Negative for ear pain, nosebleeds, congestion, sore throat and neck pain.    Eyes: Negative for blurred vision.   Respiratory: Negative for cough, sputum production, shortness of breath and wheezing.    Cardiovascular: Negative for chest pain, palpitations, orthopnea and leg swelling.   Gastrointestinal: Negative for heartburn, nausea, vomiting and abdominal pain.   Genitourinary: Negative for dysuria, urgency and frequency.   Musculoskeletal: Negative for myalgias, back pain and joint pain.   Skin: Negative for rash and itching.   Neurological: Negative for dizziness, tingling, tremors, sensory change, focal weakness and headaches.   Endo/Heme/Allergies: Does not bruise/bleed easily.   Psychiatric/Behavioral: Negative for suicidal ideas and memory loss.  The does not have insomnia.  + for depression anxiety treated with medication.  All other systems reviewed and are negative except as in HPI.    Exam: /88 (BP Location: Left arm, Patient Position: Sitting)   Pulse 96   Temp 37.2 °C (99 °F) (Temporal)   Resp 16   Ht 1.803 m (5' 10.98\")   Wt 67.4 kg (148 lb 9.6 oz)   SpO2 99%  Body mass index is 20.74 kg/m².  General: Normal appearing. No distress.  HEENT: Normocephalic. Eyes conjunctiva clear lids without ptosis, pupils equal and reactive to light accommodation, ears normal shape and contour.  Neck: Supple without JVD. Thyroid is not enlarged.  Pulmonary: Clear to ausculation.  Normal effort. " No rales, ronchi, or wheezing.  Cardiovascular: Regular rate and rhythm without murmur.   Abdomen: Soft, nontender, nondistended.   Neurologic: Grossly nonfocal.  Cranial nerves are normal.   Skin: Warm and dry.  No rashes or suspicious skin lesions.  Musculoskeletal: Normal gait. No extremity cyanosis, clubbing, or edema.  Psych: Normal mood and affect. Alert and oriented x3. Judgment and insight is normal.    Medical decision-making and discussion:  1. Anxiety  2. Major depressive disorder with single episode, in full remission (HCC)  Patient is tearful during today's appointment.  He tells me that he is ashamed of his actions.  Patient plans on developing healthy coping mechanisms.  Patient is feeling well on current medications. Will continue. Denies any suicidal or homicidal ideation. Emphasized importance of healthy diet and exercise. Discussed that should the patient have any symptoms they should call suicide prevention hotline or report to the emergency room immediately.   Discussed the importance of positive talk.  Advised patient to work on alcohol cessation as well as marijuana cessation.  Patient declined therapy at this time.  Patient will follow-up with me in 6 weeks for evaluation.    Follow-up for worsening symptoms,lack of expected recovery, or should new symptoms or problems arise.      Total of 27 minutes face-to-face time spent with patient, with greater than 50% of the total time discussing patient's issues and symptoms as listed above in assessment and plan, as well as managing coordination of care for future evaluation and treatment.        Please note that this dictation was created using voice recognition software. I have made every reasonable attempt to correct obvious errors, but I expect that there are errors of grammar and possibly content that I did not discover before finalizing the note.    Assessment/Plan:  1. Anxiety     2. Major depressive disorder with single episode, in full  remission (HCC)         Return in about 6 weeks (around 8/21/2020).

## 2020-08-21 ENCOUNTER — APPOINTMENT (OUTPATIENT)
Dept: MEDICAL GROUP | Facility: PHYSICIAN GROUP | Age: 25
End: 2020-08-21
Payer: COMMERCIAL

## 2020-08-24 ENCOUNTER — TELEMEDICINE (OUTPATIENT)
Dept: MEDICAL GROUP | Facility: PHYSICIAN GROUP | Age: 25
End: 2020-08-24
Payer: COMMERCIAL

## 2020-08-24 VITALS — HEART RATE: 97 BPM | RESPIRATION RATE: 16 BRPM | WEIGHT: 152 LBS | HEIGHT: 72 IN | BODY MASS INDEX: 20.59 KG/M2

## 2020-08-24 DIAGNOSIS — R52 BODY ACHES: ICD-10-CM

## 2020-08-24 DIAGNOSIS — F32.5 MAJOR DEPRESSIVE DISORDER WITH SINGLE EPISODE, IN FULL REMISSION (HCC): ICD-10-CM

## 2020-08-24 DIAGNOSIS — F41.9 ANXIETY: ICD-10-CM

## 2020-08-24 PROCEDURE — 99213 OFFICE O/P EST LOW 20 MIN: CPT | Mod: 95,CR | Performed by: PHYSICIAN ASSISTANT

## 2020-08-24 ASSESSMENT — FIBROSIS 4 INDEX: FIB4 SCORE: 0.31

## 2020-08-24 NOTE — PROGRESS NOTES
Virtual Visit: Established Patient   This visit was conducted via Zoom  using secure and encrypted videoconferencing technology. The patient was in a private location in the state of [place].    The patient's identity was confirmed and verbal consent was obtained for this virtual visit.    Subjective:   CC:   Chief Complaint   Patient presents with   • Follow-Up     Depression        Mauricio Mcfadden is a 25 y.o. male presenting for evaluation and management of:    Patient is a pleasant 25-year-old male here today following up on anxiety and depression.  He tells me for the past few weeks he has been experiencing intermittent episodes of body aches.  States body aches may occur over anterior chest wall, back, or other areas of his body.  States usually he will call her friend and practice positive talk and when doing so symptoms resolved.  Is currently taking Celexa 10 mg tab once daily and feels that it is managing anxiety symptoms.  He tells me today is his first day of starting back to classes and he is working full-time.  He denies homicidal or suicidal ideation.  States he has been drinking alcohol socially.  He tells me that he still with his partner and they are communicating better and getting along better.    Patient feels that he may need a new mattress.  States this may be attributing to current intermittent body aches symptoms.    ROS   Denies any recent fevers or chills. No nausea or vomiting. No chest pains or shortness of breath.     No Known Allergies    Current medicines (including changes today)  Current Outpatient Medications   Medication Sig Dispense Refill   • albuterol 108 (90 Base) MCG/ACT Aero Soln inhalation aerosol Inhale 1-2 Puffs by mouth every four hours as needed for Shortness of Breath.     • cetirizine (ZYRTEC) 10 MG Tab Take 10 mg by mouth every day.     • citalopram (CELEXA) 10 MG tablet Take 1 Tab by mouth every day. 90 Tab 1     No current facility-administered medications  for this visit.        Patient Active Problem List    Diagnosis Date Noted   • Anxiety 04/15/2019   • Major depressive disorder with single episode, in full remission (HCC) 04/15/2019   • Asthma 03/20/2019       Family History   Problem Relation Age of Onset   • No Known Problems Mother    • Prostate cancer Father    • Cancer Father         Kidney CA   • Hypertension Father    • Hypertension Brother    • Heart Disease Maternal Grandmother        He  has a past medical history of Asthma.  He  has a past surgical history that includes dental extraction(s) (2018).       Objective:   Pulse 97 Comment: Pt reported  Resp 16   Ht 1.829 m (6') Comment: Pt reported  Wt 68.9 kg (152 lb) Comment: Pt reported  BMI 20.61 kg/m²     Physical Exam:  Constitutional: Alert, no distress, well-groomed.  Skin: No rashes in visible areas.  Eye: Round. Conjunctiva clear, lids normal. No icterus.   ENMT: Lips pink without lesions, good dentition, moist mucous membranes. Phonation normal.  Neck: No masses, no thyromegaly. Moves freely without pain.  Respiratory: Unlabored respiratory effort, no cough or audible wheeze  Psych: Alert and oriented x3, normal affect and mood.       Assessment and Plan:   The following treatment plan was discussed:     1. Anxiety  2. Major depressive disorder with single episode, in full remission (HCC)  Patient is feeling well on current medications. Will continue. Denies any suicidal or homicidal ideation. Emphasized importance of healthy diet and exercise. Discussed that should the patient have any symptoms they should call suicide prevention hotline or report to the emergency room immediately.    Patient will follow-up in 2 weeks to see if dosage needs to be increased.  Patient is starting classes back and working full-time.  He is unsure if anxiety will exacerbate due to workload.    Advised patient to work on alcohol cessation.  Discussed importance of going to class.      3. Body aches  Suggested  patient to look into purchasing a new mattress.  Discussed potentially finding a mattress on cell during Labor Day or since being set up for payment plans.  Continue working on range of motion, stretching, using heat as needed, and positive talk.  It appears body aches develop with patient is experiencing increased anxiety symptoms.  Patient follow-up in 2 weeks for evaluation.        Follow-up: Return in about 2 weeks (around 9/7/2020).

## 2020-11-23 ENCOUNTER — APPOINTMENT (OUTPATIENT)
Dept: MEDICAL GROUP | Facility: PHYSICIAN GROUP | Age: 25
End: 2020-11-23
Payer: COMMERCIAL

## 2021-07-26 ENCOUNTER — OFFICE VISIT (OUTPATIENT)
Dept: URGENT CARE | Facility: CLINIC | Age: 26
End: 2021-07-26
Payer: COMMERCIAL

## 2021-07-26 VITALS
SYSTOLIC BLOOD PRESSURE: 122 MMHG | RESPIRATION RATE: 18 BRPM | OXYGEN SATURATION: 98 % | HEIGHT: 72 IN | DIASTOLIC BLOOD PRESSURE: 78 MMHG | WEIGHT: 132.6 LBS | TEMPERATURE: 97.2 F | HEART RATE: 90 BPM | BODY MASS INDEX: 17.96 KG/M2

## 2021-07-26 DIAGNOSIS — R20.2 NUMBNESS AND TINGLING IN BOTH HANDS: ICD-10-CM

## 2021-07-26 DIAGNOSIS — R20.0 NUMBNESS AND TINGLING IN BOTH HANDS: ICD-10-CM

## 2021-07-26 PROCEDURE — 99213 OFFICE O/P EST LOW 20 MIN: CPT | Performed by: PHYSICIAN ASSISTANT

## 2021-07-26 RX ORDER — ERGOCALCIFEROL 1.25 MG/1
CAPSULE ORAL
COMMUNITY
End: 2022-04-28

## 2021-07-26 ASSESSMENT — ENCOUNTER SYMPTOMS
MYALGIAS: 0
DOUBLE VISION: 0
SENSORY CHANGE: 1
WEAKNESS: 0
POLYDIPSIA: 0
FOCAL WEAKNESS: 0
TINGLING: 1
SPEECH CHANGE: 0
BLURRED VISION: 0
HEADACHES: 0
DIZZINESS: 0
BRUISES/BLEEDS EASILY: 0

## 2021-07-26 ASSESSMENT — FIBROSIS 4 INDEX: FIB4 SCORE: 0.31

## 2021-07-27 NOTE — PROGRESS NOTES
Subjective:   Mauricio Mcfadden is a 25 y.o. male who presents for Numbness (x 1 month having L hand numbness, spread to R hand and feet now)      HPI  25 y.o. male presents to urgent care with new problem to provider of numbness and tingling sensation in left hand and foot worsening x 1 month. Now feeling numbness in right hand as well.  Patient reports his symptoms are worse with video games and typing while at work.  Patient denies recent injury.  He denies symptoms of neurological deficits such as weakness.  No visual acuity changes or headaches.  Patient is right hand dominant.   Patient denies previous neck injury.   He reports some relief with ibuprofen.   Family hx of DM, denies family hx of neurological or autoimmune disorders.   Denies other associated aggravating or alleviating factors.    Review of Systems   Constitutional: Negative for malaise/fatigue.   Eyes: Negative for blurred vision and double vision.   Genitourinary: Negative for frequency.   Musculoskeletal: Negative for myalgias.   Neurological: Positive for tingling and sensory change. Negative for dizziness, speech change, focal weakness, weakness and headaches.   Endo/Heme/Allergies: Negative for polydipsia. Does not bruise/bleed easily.       Patient Active Problem List   Diagnosis   • Asthma   • Anxiety   • Major depressive disorder with single episode, in full remission (HCC)     Past Surgical History:   Procedure Laterality Date   • DENTAL EXTRACTION(S)  2018     Social History     Tobacco Use   • Smoking status: Never Smoker   • Smokeless tobacco: Never Used   Substance Use Topics   • Alcohol use: Yes     Alcohol/week: 0.0 oz     Comment: 4 drinks per week.    • Drug use: Yes     Types: Inhaled     Comment: thc daily      Family History   Problem Relation Age of Onset   • No Known Problems Mother    • Prostate cancer Father    • Cancer Father         Kidney CA   • Hypertension Father    • Hypertension Brother    • Heart Disease  Maternal Grandmother       (Allergies, Medications, & Tobacco/Substance Use were reconciled by the Medical Assistant and reviewed by myself. The family history is prepopulated)     Objective:     /78 (BP Location: Left arm, Patient Position: Sitting, BP Cuff Size: Adult)   Pulse 90   Temp 36.2 °C (97.2 °F) (Temporal)   Resp 18   Ht 1.829 m (6')   Wt 60.1 kg (132 lb 9.6 oz)   SpO2 98%   BMI 17.98 kg/m²     Physical Exam  Vitals reviewed.   Constitutional:       General: He is not in acute distress.     Appearance: Normal appearance. He is well-developed. He is not ill-appearing.   HENT:      Head: Normocephalic and atraumatic.   Eyes:      Conjunctiva/sclera: Conjunctivae normal.   Cardiovascular:      Rate and Rhythm: Normal rate and regular rhythm.   Pulmonary:      Effort: Pulmonary effort is normal. No respiratory distress.   Abdominal:      Palpations: Abdomen is soft.   Musculoskeletal:      Cervical back: Normal range of motion and neck supple.      Comments: Positive Tinnel's sign right upper extremity.    Skin:     General: Skin is warm and dry.      Findings: No erythema.   Neurological:      General: No focal deficit present.      Mental Status: He is alert and oriented to person, place, and time.      Cranial Nerves: No cranial nerve deficit.      Sensory: No sensory deficit.      Motor: No weakness.      Comments: Strength are 5 out of 5 bilateral upper and lower extremities.   Psychiatric:         Mood and Affect: Mood normal.         Behavior: Behavior normal.         Thought Content: Thought content normal.         Judgment: Judgment normal.         Assessment/Plan:     1. Numbness and tingling in both hands  REFERRAL TO SPORTS MEDICINE    diclofenac sodium 1 % Gel     Patient reports symptoms of numbness and tingling sensation in hands are worse with repetitive motion such as playing video games and typing at work.  He reports his symptoms seem to get better with taking over-the-counter  NSAIDs.  I do suspect patient's symptoms are from inflammatory issues such as tendinitis rather than related to neurological deficits.  I recommend follow-up with sports medicine for further evaluation medical treatment plan.  Continue to take over-the-counter NSAIDs with food, use as directed.  Discussed red flags and reasons to proceed to emergency department for further evaluation.    Differential diagnosis, natural history, supportive care, and indications for immediate follow-up discussed.    Advised the patient to follow-up with the primary care physician for recheck, reevaluation, and consideration of further management.  Patient verbalized understanding of treatment plan and has no further questions regarding care.     I personally reviewed prior external notes and test results pertinent to today's visit.     Please note that this dictation was created using voice recognition software. I have made a reasonable attempt to correct obvious errors, but I expect that there are errors of grammar and possibly content that I did not discover before finalizing the note.    This note was electronically signed by Marya Nicolas PA-C

## 2021-08-12 ENCOUNTER — OFFICE VISIT (OUTPATIENT)
Dept: MEDICAL GROUP | Facility: PHYSICIAN GROUP | Age: 26
End: 2021-08-12
Payer: COMMERCIAL

## 2021-08-12 VITALS
DIASTOLIC BLOOD PRESSURE: 74 MMHG | SYSTOLIC BLOOD PRESSURE: 114 MMHG | OXYGEN SATURATION: 94 % | BODY MASS INDEX: 18.69 KG/M2 | WEIGHT: 138 LBS | RESPIRATION RATE: 18 BRPM | TEMPERATURE: 98.5 F | HEIGHT: 72 IN | HEART RATE: 87 BPM

## 2021-08-12 DIAGNOSIS — R20.0 NUMBNESS AND TINGLING IN BOTH HANDS: ICD-10-CM

## 2021-08-12 DIAGNOSIS — R20.2 NUMBNESS AND TINGLING IN BOTH HANDS: ICD-10-CM

## 2021-08-12 DIAGNOSIS — M77.9 TENDINITIS: ICD-10-CM

## 2021-08-12 DIAGNOSIS — F41.1 GAD (GENERALIZED ANXIETY DISORDER): ICD-10-CM

## 2021-08-12 PROBLEM — F32.5 MAJOR DEPRESSIVE DISORDER WITH SINGLE EPISODE, IN FULL REMISSION (HCC): Status: RESOLVED | Noted: 2019-04-15 | Resolved: 2021-08-12

## 2021-08-12 PROCEDURE — 99214 OFFICE O/P EST MOD 30 MIN: CPT | Performed by: PHYSICIAN ASSISTANT

## 2021-08-12 RX ORDER — CITALOPRAM HYDROBROMIDE 10 MG/1
TABLET ORAL
Qty: 90 TABLET | Refills: 2 | Status: SHIPPED | OUTPATIENT
Start: 2021-08-12 | End: 2022-04-28

## 2021-08-12 RX ORDER — PREDNISONE 20 MG/1
TABLET ORAL
Qty: 30 TABLET | Refills: 0 | Status: SHIPPED | OUTPATIENT
Start: 2021-08-12 | End: 2022-04-28

## 2021-08-12 ASSESSMENT — FIBROSIS 4 INDEX: FIB4 SCORE: 0.32

## 2021-08-24 NOTE — PROGRESS NOTES
Chief Complaint   Patient presents with   • Numbness     bilateral hands x 1 month, possible carpal tunnel       HISTORY OF PRESENT ILLNESS: Mauricio Mcfadden is an established 26 y.o. male here to discuss the evaluation and management of:    Patient is a pleasant 26-year-old male here today to follow-up on pain anxiety and bilateral numbness of hands for the past 1 month.  He remains in urgent care prescribed topical Voltaren gel and referred to sports medicine.  He tells me he has not followed up with sports medicine yet.  States oral NSAIDs and Voltaren gel mildly alleviate symptoms.  States he was using wrist splints with no improvement of symptoms.  He admits to playing videogames frequently and he also is a  for strength throughout shift.  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  States right hand is worse than left hand.  He admits to having underlying anxiety.  Patient is not taking Celexa at this time but would like to restart medication.  Patient states he has been taking vitamin D 50,000 units once a week.  Denies having regular exercise routine.  He denies working well.  Indicates been drinking alcohol but has decreased alcohol consumption drastically.  States on average he has 1-2 drinks 2-3 times a week.  Denies homicidal or suicidal ideation.      Patient Active Problem List    Diagnosis Date Noted   • Anxiety 04/15/2019   • Asthma 03/20/2019       Allergies:Patient has no known allergies.    Current Outpatient Medications   Medication Sig Dispense Refill   • predniSONE (DELTASONE) 20 MG Tab Take 2 tablets by mouth once daily for 5 days. 30 Tablet 0   • citalopram (CELEXA) 10 MG tablet Take 1/2 tablet by mouth once daily for 2 weeks. Week 3 increase to a full tablet by mouth once daily. 90 Tablet 2   • vitamin D, Ergocalciferol, (DRISDOL) 1.25 MG (21120 UT) Cap capsule Take  by mouth every 7 days.     • diclofenac sodium 1 % Gel Apply to affected areas twice daily  as needed for treatment of inflammation. 100 g 0   • albuterol 108 (90 Base) MCG/ACT Aero Soln inhalation aerosol Inhale 1-2 Puffs by mouth every four hours as needed for Shortness of Breath.     • cetirizine (ZYRTEC) 10 MG Tab Take 10 mg by mouth every day.       No current facility-administered medications for this visit.       Social History     Tobacco Use   • Smoking status: Never Smoker   • Smokeless tobacco: Never Used   Substance Use Topics   • Alcohol use: Yes     Alcohol/week: 0.0 oz     Comment: 4 drinks per week.    • Drug use: Yes     Types: Inhaled     Comment: thc daily       Family Status   Relation Name Status   • Mo  Alive   • Fa  Alive   • Bro  Alive   • MGMo     • MGFa     • PGMo     • PGFa       Family History   Problem Relation Age of Onset   • No Known Problems Mother    • Prostate cancer Father    • Cancer Father         Kidney CA   • Hypertension Father    • Hypertension Brother    • Heart Disease Maternal Grandmother        ROS:  Review of Systems   Constitutional: Negative for fever, chills, weight loss and malaise/fatigue.   HENT: Negative for ear pain, nosebleeds, congestion, sore throat and neck pain.    Eyes: Negative for blurred vision.   Respiratory: Negative for cough, sputum production, shortness of breath and wheezing.    Cardiovascular: Negative for chest pain, palpitations, orthopnea and leg swelling.   Gastrointestinal: Negative for heartburn, nausea, vomiting and abdominal pain.   Genitourinary: Negative for dysuria, urgency and frequency.   Musculoskeletal: Negative for myalgias, back pain and joint pain.  Positive for numbness of bilateral hands.  Skin: Negative for rash and itching.   Neurological: Negative for dizziness, tingling, tremors, sensory change, focal weakness and headaches.   Endo/Heme/Allergies: Does not bruise/bleed easily.   Psychiatric/Behavioral: Negative for depression, suicidal ideas and memory loss.  The patient does not  have insomnia.    All other systems reviewed and are negative except as in HPI.    Exam: /74 (BP Location: Left arm, Patient Position: Sitting, BP Cuff Size: Adult)   Pulse 87   Temp 36.9 °C (98.5 °F) (Temporal)   Resp 18   Ht 1.829 m (6')   Wt 62.6 kg (138 lb)   SpO2 94%  Body mass index is 18.72 kg/m².  General: Normal appearing. No distress.  HEENT: Normocephalic. Eyes conjunctiva clear lids without ptosis, pupils equal and reactive to light accommodation, ears normal shape and contour.  Neck: Supple without JVD. Thyroid is not enlarged.  Pulmonary: Clear to ausculation.  Normal effort. No rales, ronchi, or wheezing.  Cardiovascular: Regular rate and rhythm without murmur.   Abdomen: Nondistended.   Neurologic: Grossly nonfocal.  Cranial nerves are normal.   Skin: Warm and dry.  No rashes or suspicious skin lesions.  Musculoskeletal: Normal gait. No extremity cyanosis, clubbing, or edema.  Negative Phalen's test.  Positive for right-sided Tinel's sign.   strength is equal bilaterally.  Negative for muscle atrophy, muscle weakness of bilateral extremities.  Negative for thenar atrophy of bilateral hands.  Posterior neck is nontender to palpation.  Cervical spine is nontender to palpation.  Psych: Normal mood and affect. Alert and oriented x3. Judgment and insight is normal.    Medical decision-making and discussion:  1. ELLA (generalized anxiety disorder)  Chronic problem.  Uncontrolled.  We prescribed Celexa for patient.  Advised patient take half a tablet of 10 mg Celexa once daily for 2 weeks then increase to full tablet on week 3.  Discussed side effects of medication with patient.  Advised patient he may experience side effects for 2+ weeks but side effect symptoms should subside and he should start on the benefits of the medication a weeks 4-6.  Advised patient continue work on diet, exercise, sleep hygiene, hydration, developing healthy coping mechanisms for anxiety.  He is without  evaluation.  Discussed emergency room precautions with patient.    - citalopram (CELEXA) 10 MG tablet; Take 1/2 tablet by mouth once daily for 2 weeks. Week 3 increase to a full tablet by mouth once daily.  Dispense: 90 Tablet; Refill: 2    2. Numbness and tingling in both hands  Advised patient follow-up with sports medicine.  Patient has been prescribed prednisone.  Discussed side effects of medication with patient.  Highly emphasized importance of decreasing videogame use.  Advised patient decrease repetitive movements if possible.  Discussed importance of wearing wrist splint.  Continue topical Voltaren gel.    3. Tendinitis  Same as # 3.  - predniSONE (DELTASONE) 20 MG Tab; Take 2 tablets by mouth once daily for 5 days.  Dispense: 30 Tablet; Refill: 0    Please note that this dictation was created using voice recognition software. I have made every reasonable attempt to correct obvious errors, but I expect that there are errors of grammar and possibly content that I did not discover before finalizing the note.    Assessment/Plan:  1. ELLA (generalized anxiety disorder)  citalopram (CELEXA) 10 MG tablet   2. Numbness and tingling in both hands     3. Tendinitis  predniSONE (DELTASONE) 20 MG Tab       Return in about 3 months (around 11/12/2021), or if symptoms worsen or fail to improve.

## 2021-09-15 DIAGNOSIS — M77.9 TENDINITIS: ICD-10-CM

## 2021-09-15 RX ORDER — PREDNISONE 20 MG/1
TABLET ORAL
Qty: 30 TABLET | Refills: 0 | OUTPATIENT
Start: 2021-09-15

## 2022-04-28 ENCOUNTER — APPOINTMENT (OUTPATIENT)
Dept: RADIOLOGY | Facility: IMAGING CENTER | Age: 27
End: 2022-04-28
Attending: NURSE PRACTITIONER
Payer: COMMERCIAL

## 2022-04-28 ENCOUNTER — OFFICE VISIT (OUTPATIENT)
Dept: URGENT CARE | Facility: CLINIC | Age: 27
End: 2022-04-28
Payer: COMMERCIAL

## 2022-04-28 VITALS
RESPIRATION RATE: 20 BRPM | HEART RATE: 123 BPM | OXYGEN SATURATION: 98 % | BODY MASS INDEX: 20.99 KG/M2 | SYSTOLIC BLOOD PRESSURE: 126 MMHG | TEMPERATURE: 98.2 F | WEIGHT: 155 LBS | HEIGHT: 72 IN | DIASTOLIC BLOOD PRESSURE: 80 MMHG

## 2022-04-28 DIAGNOSIS — S99.921A INJURY OF RIGHT FOOT, INITIAL ENCOUNTER: ICD-10-CM

## 2022-04-28 DIAGNOSIS — S93.601A SPRAIN OF RIGHT FOOT, INITIAL ENCOUNTER: ICD-10-CM

## 2022-04-28 DIAGNOSIS — S80.211A ABRASION OF RIGHT KNEE, INITIAL ENCOUNTER: ICD-10-CM

## 2022-04-28 PROCEDURE — 99213 OFFICE O/P EST LOW 20 MIN: CPT | Performed by: NURSE PRACTITIONER

## 2022-04-28 PROCEDURE — 73610 X-RAY EXAM OF ANKLE: CPT | Mod: TC,RT | Performed by: FAMILY MEDICINE

## 2022-04-28 PROCEDURE — 73630 X-RAY EXAM OF FOOT: CPT | Mod: TC,RT | Performed by: FAMILY MEDICINE

## 2022-04-28 ASSESSMENT — ENCOUNTER SYMPTOMS
LOSS OF SENSATION: 0
INABILITY TO BEAR WEIGHT: 0
MUSCLE WEAKNESS: 0
NUMBNESS: 0

## 2022-04-28 NOTE — LETTER
April 28, 2022         Patient: Mauricio Mcfadden   YOB: 1995   Date of Visit: 4/28/2022           To Whom it May Concern:    Mauricio Mcfadden was seen in my clinic on 4/28/2022. He may return to work on 5/1/22.    If you have any questions or concerns, please don't hesitate to call.        Sincerely,           NANETTE Young.  Electronically Signed
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
## 2022-09-18 ENCOUNTER — OFFICE VISIT (OUTPATIENT)
Dept: URGENT CARE | Facility: CLINIC | Age: 27
End: 2022-09-18
Payer: COMMERCIAL

## 2022-09-18 VITALS
OXYGEN SATURATION: 98 % | TEMPERATURE: 98.5 F | HEIGHT: 72 IN | BODY MASS INDEX: 20.99 KG/M2 | RESPIRATION RATE: 18 BRPM | SYSTOLIC BLOOD PRESSURE: 120 MMHG | HEART RATE: 115 BPM | WEIGHT: 155 LBS | DIASTOLIC BLOOD PRESSURE: 80 MMHG

## 2022-09-18 DIAGNOSIS — J45.21 MILD INTERMITTENT ASTHMA WITH ACUTE EXACERBATION: ICD-10-CM

## 2022-09-18 DIAGNOSIS — K14.0 GLOSSITIS: ICD-10-CM

## 2022-09-18 PROCEDURE — 99214 OFFICE O/P EST MOD 30 MIN: CPT | Performed by: PHYSICIAN ASSISTANT

## 2022-09-18 RX ORDER — DEXTROMETHORPHAN HYDROBROMIDE AND PROMETHAZINE HYDROCHLORIDE 15; 6.25 MG/5ML; MG/5ML
5 SYRUP ORAL EVERY 6 HOURS PRN
Qty: 118 ML | Refills: 0 | Status: SHIPPED | OUTPATIENT
Start: 2022-09-18 | End: 2022-09-25

## 2022-09-18 RX ORDER — PREDNISONE 10 MG/1
40 TABLET ORAL DAILY
Qty: 20 TABLET | Refills: 0 | Status: SHIPPED | OUTPATIENT
Start: 2022-09-18 | End: 2022-09-23

## 2022-09-18 RX ORDER — ALBUTEROL SULFATE 90 UG/1
2 AEROSOL, METERED RESPIRATORY (INHALATION) EVERY 6 HOURS PRN
Qty: 8.5 G | Refills: 0 | Status: SHIPPED | OUTPATIENT
Start: 2022-09-18

## 2022-09-18 RX ORDER — CHLORHEXIDINE GLUCONATE ORAL RINSE 1.2 MG/ML
15 SOLUTION DENTAL 2 TIMES DAILY
Qty: 118 ML | Refills: 0 | Status: SHIPPED | OUTPATIENT
Start: 2022-09-18

## 2022-09-18 RX ORDER — ALBUTEROL SULFATE 2.5 MG/3ML
2.5 SOLUTION RESPIRATORY (INHALATION) EVERY 4 HOURS PRN
Qty: 60 ML | Refills: 0 | Status: SHIPPED | OUTPATIENT
Start: 2022-09-18

## 2022-09-18 ASSESSMENT — ENCOUNTER SYMPTOMS
EYE PAIN: 0
MYALGIAS: 0
ABDOMINAL PAIN: 0
SHORTNESS OF BREATH: 0
CHILLS: 0
WHEEZING: 1
FEVER: 0
COUGH: 1
NAUSEA: 0
HEADACHES: 0
DIARRHEA: 0
CONSTIPATION: 0
SORE THROAT: 1
VOMITING: 0
SPUTUM PRODUCTION: 1

## 2022-09-18 NOTE — LETTER
September 18, 2022    To Whom It May Concern:         This is confirmation that Mauricio Mcfadden attended his scheduled appointment with Thony Conley P.A.-C. on 9/18/22.  This patient may require tomorrow off of work to recover from illness.         If you have any questions please do not hesitate to call me at the phone number listed below.    Sincerely,    Thony Conley P.A.-C.  [phone]  (signed electronically)
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
## 2022-10-05 NOTE — LETTER
March 14, 2019         Patient: Mauricio Mcfadden   YOB: 1995   Date of Visit: 3/14/2019           To Whom it May Concern:    Mauricio Mcfadden was seen in my clinic on 3/14/2019.  Please excuse him from missed work today.     If you have any questions or concerns, please don't hesitate to call.        Sincerely,           Nathalie Perera P.A.-C.  Electronically Signed      [General Appearance - Alert] : alert [General Appearance - In No Acute Distress] : in no acute distress [Sclera] : the sclera and conjunctiva were normal [PERRL With Normal Accommodation] : pupils were equal in size, round, and reactive to light [Extraocular Movements] : extraocular movements were intact [Outer Ear] : the ears and nose were normal in appearance [Oropharynx] : the oropharynx was normal [Neck Appearance] : the appearance of the neck was normal [Jugular Venous Distention Increased] : there was no jugular-venous distention [Respiration, Rhythm And Depth] : normal respiratory rhythm and effort [Exaggerated Use Of Accessory Muscles For Inspiration] : no accessory muscle use [Heart Rate And Rhythm] : heart rate was normal and rhythm regular [Heart Sounds] : normal S1 and S2 [Full Pulse] : the pedal pulses are present [Bowel Sounds] : normal bowel sounds [Abdomen Soft] : soft [Abdomen Tenderness] : non-tender [No CVA Tenderness] : no ~M costovertebral angle tenderness [Involuntary Movements] : no involuntary movements were seen [Musculoskeletal - Swelling] : no joint swelling seen [FreeTextEntry1] : cane to ambulate [Skin Color & Pigmentation] : normal skin color and pigmentation [Skin Turgor] : normal skin turgor [] : no rash [Cranial Nerves] : cranial nerves 2-12 were intact [No Focal Deficits] : no focal deficits [Oriented To Time, Place, And Person] : oriented to person, place, and time [Impaired Insight] : insight and judgment were intact [Affect] : the affect was normal

## 2023-07-20 ENCOUNTER — OFFICE VISIT (OUTPATIENT)
Dept: URGENT CARE | Facility: CLINIC | Age: 28
End: 2023-07-20
Payer: COMMERCIAL

## 2023-07-20 ENCOUNTER — HOSPITAL ENCOUNTER (OUTPATIENT)
Facility: MEDICAL CENTER | Age: 28
End: 2023-07-20
Attending: FAMILY MEDICINE
Payer: COMMERCIAL

## 2023-07-20 VITALS
HEIGHT: 72 IN | OXYGEN SATURATION: 100 % | DIASTOLIC BLOOD PRESSURE: 90 MMHG | TEMPERATURE: 99.1 F | HEART RATE: 107 BPM | SYSTOLIC BLOOD PRESSURE: 140 MMHG | RESPIRATION RATE: 16 BRPM | WEIGHT: 133.7 LBS | BODY MASS INDEX: 18.11 KG/M2

## 2023-07-20 DIAGNOSIS — R03.0 ELEVATED BLOOD PRESSURE READING: ICD-10-CM

## 2023-07-20 DIAGNOSIS — R39.9 UTI SYMPTOMS: ICD-10-CM

## 2023-07-20 DIAGNOSIS — R00.0 TACHYCARDIA: ICD-10-CM

## 2023-07-20 LAB
APPEARANCE UR: CLEAR
BILIRUB UR STRIP-MCNC: NEGATIVE MG/DL
COLOR UR AUTO: YELLOW
GLUCOSE UR STRIP.AUTO-MCNC: NEGATIVE MG/DL
KETONES UR STRIP.AUTO-MCNC: NORMAL MG/DL
LEUKOCYTE ESTERASE UR QL STRIP.AUTO: NEGATIVE
NITRITE UR QL STRIP.AUTO: NEGATIVE
PH UR STRIP.AUTO: 7 [PH] (ref 5–8)
PROT UR QL STRIP: NEGATIVE MG/DL
RBC UR QL AUTO: NEGATIVE
SP GR UR STRIP.AUTO: 1.01
UROBILINOGEN UR STRIP-MCNC: 0.2 MG/DL

## 2023-07-20 PROCEDURE — 87491 CHLMYD TRACH DNA AMP PROBE: CPT

## 2023-07-20 PROCEDURE — 99214 OFFICE O/P EST MOD 30 MIN: CPT | Performed by: FAMILY MEDICINE

## 2023-07-20 PROCEDURE — 81002 URINALYSIS NONAUTO W/O SCOPE: CPT | Performed by: FAMILY MEDICINE

## 2023-07-20 PROCEDURE — 87591 N.GONORRHOEAE DNA AMP PROB: CPT

## 2023-07-20 PROCEDURE — 3077F SYST BP >= 140 MM HG: CPT | Performed by: FAMILY MEDICINE

## 2023-07-20 PROCEDURE — 3080F DIAST BP >= 90 MM HG: CPT | Performed by: FAMILY MEDICINE

## 2023-07-20 NOTE — PROGRESS NOTES
Subjective:      27 y.o. male presents to urgent care for concerns of a bladder infection.  For the last 2 weeks he has been experiencing increased urinary urgency, frequency, and dysuria.  No associated hematuria.  Bowel movements are regular and soft.  He drinks an average of 40 oz of water and 0 caffeinated beverages.  He is currently sexually active with multiple, female partner and they do not use any form of contraception.  No penile rashes, lesions, or discharge.    Heart rate and blood pressure are both elevated today.  He does not have a history of either of these and does not take any medication for it.  He denies any chest pain, palpitations, or shortness of breath.    He denies any other questions or concerns at this time.    Current problem list, medication, and past medical/surgical history were reviewed in Epic.    ROS  See HPI     Objective:      BP (!) 140/90 (BP Location: Left arm, Patient Position: Sitting)   Pulse (!) 107   Temp 37.3 °C (99.1 °F) (Temporal)   Resp 16   Ht 1.829 m (6')   Wt 60.6 kg (133 lb 11.2 oz)   SpO2 100%   BMI 18.13 kg/m²     Physical Exam  Constitutional:       General: He is not in acute distress.     Appearance: He is not diaphoretic.   Cardiovascular:      Rate and Rhythm: Regular rhythm. Tachycardia present.      Heart sounds: Normal heart sounds.   Pulmonary:      Effort: Pulmonary effort is normal. No respiratory distress.      Breath sounds: Normal breath sounds.   Abdominal:      General: Bowel sounds are normal.      Palpations: Abdomen is soft.      Tenderness: There is no abdominal tenderness. There is no right CVA tenderness or left CVA tenderness.   Neurological:      Mental Status: He is alert.   Psychiatric:         Mood and Affect: Affect normal.         Judgment: Judgment normal.       Assessment/Plan:     1. UTI symptoms  2. Elevated blood pressure reading  3. Tachycardia  Systemic symptoms seen through elevated blood pressure and tachycardia.   These are asymptomatic.  No sign of infection on urinalysis.  Gonorrhea and Chlamydia testing has been sent.  We will treat appropriately once resulted.  - POCT Urinalysis  - Chlamydia/GC, PCR (Urine); Future      Instructed to return to Urgent Care or nearest Emergency Department if symptoms fail to improve, for any change in condition, further concerns, or new concerning symptoms. Patient states understanding of the plan of care and discharge instructions.    Berna Gambino M.D.

## 2023-07-21 LAB
C TRACH DNA SPEC QL NAA+PROBE: NEGATIVE
N GONORRHOEA DNA SPEC QL NAA+PROBE: NEGATIVE
SPECIMEN SOURCE: NORMAL

## 2024-10-16 ENCOUNTER — APPOINTMENT (OUTPATIENT)
Dept: URGENT CARE | Facility: CLINIC | Age: 29
End: 2024-10-16
Payer: COMMERCIAL

## 2025-04-18 ENCOUNTER — APPOINTMENT (OUTPATIENT)
Dept: URGENT CARE | Facility: CLINIC | Age: 30
End: 2025-04-18
Payer: COMMERCIAL

## 2025-04-22 ENCOUNTER — OFFICE VISIT (OUTPATIENT)
Dept: URGENT CARE | Facility: CLINIC | Age: 30
End: 2025-04-22
Payer: COMMERCIAL

## 2025-04-22 ENCOUNTER — APPOINTMENT (OUTPATIENT)
Dept: URGENT CARE | Facility: CLINIC | Age: 30
End: 2025-04-22
Payer: COMMERCIAL

## 2025-04-22 VITALS
OXYGEN SATURATION: 100 % | DIASTOLIC BLOOD PRESSURE: 78 MMHG | RESPIRATION RATE: 12 BRPM | HEIGHT: 72 IN | TEMPERATURE: 98.5 F | SYSTOLIC BLOOD PRESSURE: 124 MMHG | BODY MASS INDEX: 19.91 KG/M2 | WEIGHT: 147 LBS | HEART RATE: 84 BPM

## 2025-04-22 DIAGNOSIS — K59.00 CONSTIPATION, UNSPECIFIED CONSTIPATION TYPE: ICD-10-CM

## 2025-04-22 DIAGNOSIS — S29.019A ACUTE THORACIC MYOFASCIAL STRAIN, INITIAL ENCOUNTER: ICD-10-CM

## 2025-04-22 DIAGNOSIS — F10.10 ALCOHOL ABUSE: ICD-10-CM

## 2025-04-22 PROCEDURE — 99214 OFFICE O/P EST MOD 30 MIN: CPT | Mod: 25

## 2025-04-22 PROCEDURE — 96372 THER/PROPH/DIAG INJ SC/IM: CPT

## 2025-04-22 RX ORDER — KETOROLAC TROMETHAMINE 15 MG/ML
15 INJECTION, SOLUTION INTRAMUSCULAR; INTRAVENOUS ONCE
Status: COMPLETED | OUTPATIENT
Start: 2025-04-22 | End: 2025-04-22

## 2025-04-22 RX ORDER — CYCLOBENZAPRINE HCL 5 MG
5-10 TABLET ORAL
Qty: 15 TABLET | Refills: 0 | Status: SHIPPED | OUTPATIENT
Start: 2025-04-22

## 2025-04-22 RX ORDER — LIDOCAINE 50 MG/G
1 PATCH TOPICAL EVERY 24 HOURS
Qty: 15 PATCH | Refills: 0 | Status: SHIPPED | OUTPATIENT
Start: 2025-04-22 | End: 2025-04-22

## 2025-04-22 RX ORDER — DOCUSATE SODIUM 100 MG/1
100 CAPSULE, LIQUID FILLED ORAL 2 TIMES DAILY PRN
Qty: 15 CAPSULE | Refills: 0 | Status: SHIPPED | OUTPATIENT
Start: 2025-04-22

## 2025-04-22 RX ORDER — LIDOCAINE 50 MG/G
1 PATCH TOPICAL EVERY 24 HOURS
Qty: 15 PATCH | Refills: 0 | Status: SHIPPED | OUTPATIENT
Start: 2025-04-22

## 2025-04-22 RX ADMIN — KETOROLAC TROMETHAMINE 15 MG: 15 INJECTION, SOLUTION INTRAMUSCULAR; INTRAVENOUS at 10:58

## 2025-04-22 RX ADMIN — KETOROLAC TROMETHAMINE 15 MG: 15 INJECTION, SOLUTION INTRAMUSCULAR; INTRAVENOUS at 10:56

## 2025-04-22 ASSESSMENT — ENCOUNTER SYMPTOMS
TINGLING: 0
FEVER: 0
SENSORY CHANGE: 0
FALLS: 0
SEIZURES: 0
VOMITING: 0
CONSTIPATION: 1
ABDOMINAL PAIN: 0
WEIGHT LOSS: 0
TREMORS: 0
MYALGIAS: 0
BLOOD IN STOOL: 0
FOCAL WEAKNESS: 0
WEAKNESS: 0
HEADACHES: 0
DIARRHEA: 0
CHILLS: 0
SPEECH CHANGE: 0
DIZZINESS: 0
HEARTBURN: 0
NAUSEA: 0
NECK PAIN: 0
BACK PAIN: 1

## 2025-04-22 NOTE — TELEPHONE ENCOUNTER
Dear Mauricio,     Your prescription for   Requested Prescriptions     Signed Prescriptions Disp Refills    lidocaine (LIDODERM) 5 % Patch 15 Patch 0     Sig: PLACE 1 PATCH ON THE SKIN EVERY 24 HOURS.     Authorizing Provider: KELLEE STATON        has been sent to the   [facility]/pharmacy #[id number] - [place] - [address] [place]  [address]  [address]  [facility] [address]  Phone: [phone] Fax: [phone]    [facility] #[id number] - [place] - [address]  [address] [address]  Phone: [phone] Fax: [phone]      Please contact your pharmacy to see when it will be ready for you to .      Thank you,     Stiven Rojas, Med Ass't

## 2025-04-22 NOTE — PROGRESS NOTES
"  Subjective:   CHIEF COMPLAINT  Chief Complaint   Patient presents with    Back Pain     Patient states he has back Px, not work related, middle-back. Patient states pain started last Sunday. States he woke up with a sharp pain in stomach and having diarrhea pain. Did states he did drink a lot that day          Mauricio Mcfadden is a very pleasant 29 y.o. male who presents for Back Pain (Patient states he has back Px, not work related, middle-back. Patient states pain started last Sunday. States he woke up with a sharp pain in stomach and having diarrhea pain. Did states he did drink a lot that day )      Patient presents with complaints of constipation and mid back pain.  Patient states that he had a heavy night of drinking approximately a week ago, when he woke up he noticed that he was having some back soreness.  He denies as far as he can tell, any falls, injuries, or trauma.  He does state that he works at home and \"sits and lays a lot.\"  He denies any numbness weakness tingling, no loss of bowel or bladder.  He does however state that last month or so given previous to back pain showing up he has been having constipation.  After his night of drinking he did have some diarrhea, though last week he has had only 1 full bowel movement with the rest being \"rabbit like.\"  He denies any dysuria focal abdominal pain.  No nausea or vomiting.  No fever chills or bodyaches.  Of note he does state that since about October he has been drinking quite heavily.  He has switched to only drinking on the weekends in the last couple weeks.    Back Pain  Pertinent negatives include no abdominal pain, chest pain, dysuria, fever, headaches, tingling, weakness or weight loss.       Review of Systems   Constitutional:  Negative for chills, fever, malaise/fatigue and weight loss.   Cardiovascular:  Negative for chest pain.   Gastrointestinal:  Positive for constipation. Negative for abdominal pain, blood in stool, diarrhea, " heartburn, melena, nausea and vomiting.   Genitourinary:  Negative for dysuria.   Musculoskeletal:  Positive for back pain. Negative for falls, joint pain, myalgias and neck pain.   Neurological:  Negative for dizziness, tingling, tremors, sensory change, speech change, focal weakness, seizures, weakness and headaches.   All other systems reviewed and are negative.    Refer to HPI for additional details.    During this visit, appropriate PPE was worn, and hand hygiene was performed.    PMH:  has a past medical history of Asthma.    MEDS:   Current Outpatient Medications:     lidocaine (LIDODERM) 5 % Patch, Place 1 Patch on the skin every 24 hours., Disp: 15 Patch, Rfl: 0    cyclobenzaprine (FLEXERIL) 5 MG tablet, Take 1-2 Tablets by mouth at bedtime as needed for Muscle Spasms., Disp: 15 Tablet, Rfl: 0    docusate sodium (COLACE) 100 MG Cap, Take 1 Capsule by mouth 2 times a day as needed for Constipation., Disp: 15 Capsule, Rfl: 0    albuterol 108 (90 Base) MCG/ACT Aero Soln inhalation aerosol, Inhale 2 Puffs every 6 hours as needed for Shortness of Breath., Disp: 8.5 g, Rfl: 0    albuterol (PROVENTIL) 2.5mg/3ml Nebu Soln solution for nebulization, Take 3 mL by nebulization every four hours as needed for Shortness of Breath., Disp: 60 mL, Rfl: 0    chlorhexidine (PERIDEX) 0.12 % Solution, Take 15 mL by mouth 2 times a day. (Patient not taking: Reported on 7/20/2023), Disp: 118 mL, Rfl: 0    Current Facility-Administered Medications:     ketorolac (Toradol) 15 MG/ML injection 15 mg, 15 mg, Intramuscular, Once,     ALLERGIES: No Known Allergies  SURGHX:   Past Surgical History:   Procedure Laterality Date    DENTAL EXTRACTION(S)  2018     SOCHX:  reports that he has never smoked. He has never used smokeless tobacco. He reports current alcohol use. He reports current drug use. Drugs: Inhaled and Marijuana.    FH: Per HPI as applicable/pertinent.    Medications, Allergies, and current problem list reviewed today in  "Epic.     Objective:     /78   Pulse 84   Temp 36.9 °C (98.5 °F) (Temporal)   Resp 12   Ht 1.829 m (6')   Wt 66.7 kg (147 lb)   SpO2 100%     Physical Exam  Constitutional:       General: He is not in acute distress.     Appearance: Normal appearance. He is not ill-appearing, toxic-appearing or diaphoretic.   HENT:      Head: Normocephalic and atraumatic.      Nose: Nose normal.      Mouth/Throat:      Mouth: Mucous membranes are moist.      Pharynx: Oropharynx is clear. No oropharyngeal exudate or posterior oropharyngeal erythema.   Eyes:      General: No scleral icterus.     Pupils: Pupils are equal, round, and reactive to light.   Cardiovascular:      Rate and Rhythm: Normal rate.      Pulses: Normal pulses.   Pulmonary:      Effort: Pulmonary effort is normal. No respiratory distress.      Breath sounds: Normal breath sounds.   Abdominal:      General: Abdomen is flat. There is no distension.      Tenderness: There is no abdominal tenderness. There is no right CVA tenderness, left CVA tenderness or guarding.   Musculoskeletal:      Cervical back: Normal.      Thoracic back: Spasms present. No swelling, edema, deformity, signs of trauma, lacerations, tenderness or bony tenderness. Decreased range of motion. No scoliosis.      Lumbar back: Normal.        Back:       Comments: No tenderness to palpation the above marked area.  Muscle is spasming and tight.  Patient has subjective complaints of \"soreness and tightness.\"   Neurological:      Mental Status: He is alert.         Assessment/Plan:     Diagnosis and associated orders:     1. Acute thoracic myofascial strain, initial encounter  - ketorolac (Toradol) 15 MG/ML injection 15 mg  - ketorolac (Toradol) 15 MG/ML injection 15 mg  - lidocaine (LIDODERM) 5 % Patch; Place 1 Patch on the skin every 24 hours.  Dispense: 15 Patch; Refill: 0  - cyclobenzaprine (FLEXERIL) 5 MG tablet; Take 1-2 Tablets by mouth at bedtime as needed for Muscle Spasms.  Dispense: " 15 Tablet; Refill: 0    2. Constipation, unspecified constipation type  - docusate sodium (COLACE) 100 MG Cap; Take 1 Capsule by mouth 2 times a day as needed for Constipation.  Dispense: 15 Capsule; Refill: 0    3. Alcohol abuse     Comments/MDM:     Patient history and physical exam are consistent with multiple diagnoses of varying etiologies.   Patient's primary reason for visit is consistent with acute myofascial thoracic sprain.  I have high suspicion that this is due to patient's overall sedentary lifestyle and was likely exacerbated it from dehydration from drinking or possibly tweaking it while doing something that he cannot remember under the influence.  Overall very well presenting with very low suspicion for any osseous involvement or any neurological involvement.  At this point did recommend treating with an clinic Toradol injection followed by staggered Tylenol and ibuprofen 24 hours from injection and lidocaine patches.  Patient second reason for visit is consistent with acute constipation.  Likely due to poor diet as well as lack of hydration.  I discussed remaining hydrated, as well as staying active to help promote colonic motility as well as help with his back strain.  Will write for Colace as needed, avoid processed foods, low fiber foods, high fat foods, and try and increase fiber and roughage.  Did have a long discussion with patient about his alcohol use.  I did explain to him that not only this is possibly adding to or prolonging some of his symptoms that he is experiencing today, can also have other lasting impacts.  I advised that he try cutting back as opposed to complete cessation to ensure longevity as well as sustainability and his cessation.  Did advise that he reach out to his PCP to discuss possible adjunct treatment to help.  Patient understands plan of care on board  Follow up in 3-5 days if no improvement in symptoms    ED precautions  discussed.  Patient understands RTC and ED  precautions  Discussion and collaborative decision making used with the patient myself to develop treatment plan.  Patient understands the treatment plan of care, and further follow-up if needed.  No further questions           Differential diagnosis, natural history, supportive care, and indications for immediate follow-up discussed.    Advised the patient to follow-up with the primary care physician for recheck, reevaluation, and consideration of further management.    Please note that this dictation was created using voice recognition software. I have made a reasonable attempt to correct obvious errors, but I expect that there are errors of grammar and possibly content that I did not discover before finalizing the note.    This note was electronically signed by MARLEN Lei

## 2025-06-07 ENCOUNTER — OFFICE VISIT (OUTPATIENT)
Dept: URGENT CARE | Facility: PHYSICIAN GROUP | Age: 30
End: 2025-06-07
Payer: COMMERCIAL

## 2025-06-07 VITALS
DIASTOLIC BLOOD PRESSURE: 84 MMHG | OXYGEN SATURATION: 100 % | RESPIRATION RATE: 18 BRPM | SYSTOLIC BLOOD PRESSURE: 126 MMHG | HEART RATE: 103 BPM | HEIGHT: 72 IN | TEMPERATURE: 99.1 F | WEIGHT: 148.59 LBS | BODY MASS INDEX: 20.13 KG/M2
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
## 2025-06-07 DIAGNOSIS — M25.512 ACUTE PAIN OF LEFT SHOULDER: ICD-10-CM

## 2025-06-07 DIAGNOSIS — S60.511A ABRASION OF RIGHT HAND, INITIAL ENCOUNTER: ICD-10-CM

## 2025-06-07 PROCEDURE — 99213 OFFICE O/P EST LOW 20 MIN: CPT | Performed by: FAMILY MEDICINE

## 2025-06-07 PROCEDURE — 3074F SYST BP LT 130 MM HG: CPT | Performed by: FAMILY MEDICINE

## 2025-06-07 PROCEDURE — 3079F DIAST BP 80-89 MM HG: CPT | Performed by: FAMILY MEDICINE

## 2025-06-07 NOTE — LETTER
June 7, 2025    To Whom It May Concern:         This is confirmation that Mauricio Mcfadden attended his scheduled appointment with Berna Gambino M.D. on 6/07/25. He may return to work on Wednesday without any restrictions.         If you have any questions please do not hesitate to call me at the phone number listed below.    Sincerely,          Berna Gambino M.D.  [phone]
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
